# Patient Record
Sex: FEMALE | Race: OTHER | HISPANIC OR LATINO
[De-identification: names, ages, dates, MRNs, and addresses within clinical notes are randomized per-mention and may not be internally consistent; named-entity substitution may affect disease eponyms.]

---

## 2017-01-09 ENCOUNTER — RECORD ABSTRACTING (OUTPATIENT)
Age: 62
End: 2017-01-09

## 2017-01-17 ENCOUNTER — RX RENEWAL (OUTPATIENT)
Age: 62
End: 2017-01-17

## 2017-01-19 ENCOUNTER — APPOINTMENT (OUTPATIENT)
Dept: INTERNAL MEDICINE | Facility: CLINIC | Age: 62
End: 2017-01-19

## 2017-01-19 VITALS — HEIGHT: 63 IN | DIASTOLIC BLOOD PRESSURE: 74 MMHG | HEART RATE: 86 BPM | SYSTOLIC BLOOD PRESSURE: 118 MMHG

## 2017-01-19 VITALS — BODY MASS INDEX: 30.29 KG/M2 | WEIGHT: 171 LBS

## 2017-03-01 ENCOUNTER — APPOINTMENT (OUTPATIENT)
Dept: ENDOCRINOLOGY | Facility: CLINIC | Age: 62
End: 2017-03-01

## 2017-03-01 VITALS — BODY MASS INDEX: 29.41 KG/M2 | HEIGHT: 63 IN | WEIGHT: 166 LBS

## 2017-03-01 VITALS — DIASTOLIC BLOOD PRESSURE: 60 MMHG | SYSTOLIC BLOOD PRESSURE: 102 MMHG | HEART RATE: 92 BPM

## 2017-05-16 ENCOUNTER — APPOINTMENT (OUTPATIENT)
Dept: INTERNAL MEDICINE | Facility: CLINIC | Age: 62
End: 2017-05-16

## 2017-05-16 VITALS
BODY MASS INDEX: 29.41 KG/M2 | SYSTOLIC BLOOD PRESSURE: 108 MMHG | HEIGHT: 63 IN | HEART RATE: 83 BPM | WEIGHT: 166 LBS | DIASTOLIC BLOOD PRESSURE: 72 MMHG

## 2017-05-30 ENCOUNTER — OUTPATIENT (OUTPATIENT)
Dept: OUTPATIENT SERVICES | Facility: HOSPITAL | Age: 62
LOS: 1 days | Discharge: HOME | End: 2017-05-30

## 2017-06-28 DIAGNOSIS — D64.9 ANEMIA, UNSPECIFIED: ICD-10-CM

## 2017-06-28 DIAGNOSIS — E78.00 PURE HYPERCHOLESTEROLEMIA, UNSPECIFIED: ICD-10-CM

## 2017-06-28 DIAGNOSIS — R80.1 PERSISTENT PROTEINURIA, UNSPECIFIED: ICD-10-CM

## 2017-06-28 DIAGNOSIS — I10 ESSENTIAL (PRIMARY) HYPERTENSION: ICD-10-CM

## 2017-08-15 ENCOUNTER — APPOINTMENT (OUTPATIENT)
Dept: INTERNAL MEDICINE | Facility: CLINIC | Age: 62
End: 2017-08-15

## 2017-09-26 ENCOUNTER — OUTPATIENT (OUTPATIENT)
Dept: OUTPATIENT SERVICES | Facility: HOSPITAL | Age: 62
LOS: 1 days | Discharge: HOME | End: 2017-09-26

## 2017-09-26 DIAGNOSIS — E78.00 PURE HYPERCHOLESTEROLEMIA, UNSPECIFIED: ICD-10-CM

## 2017-09-26 DIAGNOSIS — D64.9 ANEMIA, UNSPECIFIED: ICD-10-CM

## 2017-09-26 DIAGNOSIS — I10 ESSENTIAL (PRIMARY) HYPERTENSION: ICD-10-CM

## 2017-09-26 DIAGNOSIS — R80.1 PERSISTENT PROTEINURIA, UNSPECIFIED: ICD-10-CM

## 2017-10-24 ENCOUNTER — OUTPATIENT (OUTPATIENT)
Dept: OUTPATIENT SERVICES | Facility: HOSPITAL | Age: 62
LOS: 1 days | Discharge: HOME | End: 2017-10-24

## 2017-10-24 ENCOUNTER — APPOINTMENT (OUTPATIENT)
Dept: INTERNAL MEDICINE | Facility: CLINIC | Age: 62
End: 2017-10-24

## 2017-10-24 VITALS
DIASTOLIC BLOOD PRESSURE: 72 MMHG | BODY MASS INDEX: 29.23 KG/M2 | HEART RATE: 90 BPM | HEIGHT: 63 IN | WEIGHT: 165 LBS | SYSTOLIC BLOOD PRESSURE: 106 MMHG

## 2017-10-25 DIAGNOSIS — E11.9 TYPE 2 DIABETES MELLITUS WITHOUT COMPLICATIONS: ICD-10-CM

## 2017-10-25 DIAGNOSIS — M32.9 SYSTEMIC LUPUS ERYTHEMATOSUS, UNSPECIFIED: ICD-10-CM

## 2017-10-25 DIAGNOSIS — K21.9 GASTRO-ESOPHAGEAL REFLUX DISEASE WITHOUT ESOPHAGITIS: ICD-10-CM

## 2017-12-08 ENCOUNTER — OUTPATIENT (OUTPATIENT)
Dept: OUTPATIENT SERVICES | Facility: HOSPITAL | Age: 62
LOS: 1 days | Discharge: HOME | End: 2017-12-08

## 2017-12-08 ENCOUNTER — APPOINTMENT (OUTPATIENT)
Dept: GASTROENTEROLOGY | Facility: CLINIC | Age: 62
End: 2017-12-08

## 2017-12-08 VITALS
DIASTOLIC BLOOD PRESSURE: 74 MMHG | BODY MASS INDEX: 29.06 KG/M2 | SYSTOLIC BLOOD PRESSURE: 112 MMHG | WEIGHT: 164 LBS | HEART RATE: 90 BPM | HEIGHT: 63 IN

## 2017-12-11 DIAGNOSIS — R74.8 ABNORMAL LEVELS OF OTHER SERUM ENZYMES: ICD-10-CM

## 2017-12-11 DIAGNOSIS — Z12.11 ENCOUNTER FOR SCREENING FOR MALIGNANT NEOPLASM OF COLON: ICD-10-CM

## 2017-12-11 DIAGNOSIS — R13.10 DYSPHAGIA, UNSPECIFIED: ICD-10-CM

## 2017-12-11 LAB
PERCENT SATURATION (NORTH): 17.4 %
TRANSFERRIN SERPL-MCNC: 266 MG/DL

## 2017-12-15 LAB
A1AT SERPL-MCNC: 158 MG/DL
CERULOPLASMIN SERPL-MCNC: 35 MG/DL
MITOCHONDRIA AB SER QL IF: NEGATIVE

## 2018-02-13 ENCOUNTER — OUTPATIENT (OUTPATIENT)
Dept: OUTPATIENT SERVICES | Facility: HOSPITAL | Age: 63
LOS: 1 days | Discharge: HOME | End: 2018-02-13

## 2018-02-13 DIAGNOSIS — I10 ESSENTIAL (PRIMARY) HYPERTENSION: ICD-10-CM

## 2018-02-13 DIAGNOSIS — E55.9 VITAMIN D DEFICIENCY, UNSPECIFIED: ICD-10-CM

## 2018-02-13 DIAGNOSIS — E78.00 PURE HYPERCHOLESTEROLEMIA, UNSPECIFIED: ICD-10-CM

## 2018-05-18 ENCOUNTER — OUTPATIENT (OUTPATIENT)
Dept: OUTPATIENT SERVICES | Facility: HOSPITAL | Age: 63
LOS: 1 days | Discharge: HOME | End: 2018-05-18

## 2018-05-18 DIAGNOSIS — Z00.00 ENCOUNTER FOR GENERAL ADULT MEDICAL EXAMINATION WITHOUT ABNORMAL FINDINGS: ICD-10-CM

## 2018-05-18 DIAGNOSIS — E11.9 TYPE 2 DIABETES MELLITUS WITHOUT COMPLICATIONS: ICD-10-CM

## 2018-05-18 LAB
ALBUMIN SERPL ELPH-MCNC: 4.2 G/DL
ALP BLD-CCNC: 79 U/L
ALT SERPL-CCNC: 53 U/L
ANION GAP SERPL CALC-SCNC: 15 MMOL/L
AST SERPL-CCNC: 29 U/L
BILIRUB SERPL-MCNC: 0.5 MG/DL
BUN SERPL-MCNC: 14 MG/DL
CALCIUM SERPL-MCNC: 10.3 MG/DL
CHLORIDE SERPL-SCNC: 96 MMOL/L
CO2 SERPL-SCNC: 23 MMOL/L
CREAT SERPL-MCNC: 0.9 MG/DL
GLUCOSE SERPL-MCNC: 262 MG/DL
POTASSIUM SERPL-SCNC: 4.7 MMOL/L
PROT SERPL-MCNC: 7.4 G/DL
SODIUM SERPL-SCNC: 134 MMOL/L

## 2018-05-21 ENCOUNTER — APPOINTMENT (OUTPATIENT)
Dept: INTERNAL MEDICINE | Facility: CLINIC | Age: 63
End: 2018-05-21

## 2018-05-21 ENCOUNTER — OUTPATIENT (OUTPATIENT)
Dept: OUTPATIENT SERVICES | Facility: HOSPITAL | Age: 63
LOS: 1 days | Discharge: HOME | End: 2018-05-21

## 2018-05-21 VITALS
BODY MASS INDEX: 28.7 KG/M2 | SYSTOLIC BLOOD PRESSURE: 119 MMHG | WEIGHT: 162 LBS | DIASTOLIC BLOOD PRESSURE: 81 MMHG | HEART RATE: 78 BPM | HEIGHT: 63 IN

## 2018-05-21 DIAGNOSIS — M32.9 SYSTEMIC LUPUS ERYTHEMATOSUS, UNSPECIFIED: ICD-10-CM

## 2018-05-21 DIAGNOSIS — I10 ESSENTIAL (PRIMARY) HYPERTENSION: ICD-10-CM

## 2018-05-21 DIAGNOSIS — E11.9 TYPE 2 DIABETES MELLITUS WITHOUT COMPLICATIONS: ICD-10-CM

## 2018-05-21 LAB
BASOPHILS # BLD AUTO: 0.04 K/UL
BASOPHILS NFR BLD AUTO: 0.7 %
EOSINOPHIL # BLD AUTO: 0.03 K/UL
EOSINOPHIL NFR BLD AUTO: 0.5 %
HBA1C MFR BLD HPLC: 11.3 %
HCT VFR BLD CALC: 41.6 %
HGB BLD-MCNC: 13.6 G/DL
IMM GRANULOCYTES NFR BLD AUTO: 0.2 %
LYMPHOCYTES # BLD AUTO: 2.76 K/UL
LYMPHOCYTES NFR BLD AUTO: 49.6 %
MAN DIFF?: NORMAL
MCHC RBC-ENTMCNC: 30.3 PG
MCHC RBC-ENTMCNC: 32.7 G/DL
MCV RBC AUTO: 92.7 FL
MONOCYTES # BLD AUTO: 0.36 K/UL
MONOCYTES NFR BLD AUTO: 6.5 %
NEUTROPHILS # BLD AUTO: 2.37 K/UL
NEUTROPHILS NFR BLD AUTO: 42.5 %
PLATELET # BLD AUTO: 325 K/UL
RBC # BLD: 4.49 M/UL
RBC # FLD: 12.7 %
WBC # FLD AUTO: 5.57 K/UL

## 2018-05-21 NOTE — HISTORY OF PRESENT ILLNESS
[FreeTextEntry1] : Follow up blood results and referral to opthalmology [de-identified] : This is 59 yo Female patient with PMH of DLD, HTN, DM II, SLE presents for reviewing her blood reports and referral to opthalmology clinic.

## 2018-05-21 NOTE — ASSESSMENT
[FreeTextEntry1] : This is 61 yo Female patient with PMH of DLD, HTN, DM II, SLE presents for reviewing her blood reports\par \par #) Diabetes Mellitus\par - uncontrolled, HbA1C 11.1\par - referral to endocrine, opthalmology and podiatry\par - repeat HbA1C in 3 months\par \par #) Dyslipidemia,oa\par - continue Lipitor 40mg\par - check lipid panel in 3 months and prn\par \par #) Hypertension \par - continue current medications \par \par #) Return to clinic in 3 months and prn.

## 2018-05-22 ENCOUNTER — APPOINTMENT (OUTPATIENT)
Dept: INTERNAL MEDICINE | Facility: CLINIC | Age: 63
End: 2018-05-22

## 2018-05-31 ENCOUNTER — OUTPATIENT (OUTPATIENT)
Dept: OUTPATIENT SERVICES | Facility: HOSPITAL | Age: 63
LOS: 1 days | Discharge: HOME | End: 2018-05-31

## 2018-05-31 ENCOUNTER — APPOINTMENT (OUTPATIENT)
Dept: ENDOCRINOLOGY | Facility: CLINIC | Age: 63
End: 2018-05-31

## 2018-05-31 VITALS
HEART RATE: 84 BPM | HEIGHT: 63 IN | WEIGHT: 162 LBS | BODY MASS INDEX: 28.7 KG/M2 | DIASTOLIC BLOOD PRESSURE: 77 MMHG | SYSTOLIC BLOOD PRESSURE: 119 MMHG

## 2018-05-31 DIAGNOSIS — Z86.39 PERSONAL HISTORY OF OTHER ENDOCRINE, NUTRITIONAL AND METABOLIC DISEASE: ICD-10-CM

## 2018-05-31 DIAGNOSIS — Z92.89 PERSONAL HISTORY OF OTHER MEDICAL TREATMENT: ICD-10-CM

## 2018-05-31 DIAGNOSIS — N18.2 CHRONIC KIDNEY DISEASE, STAGE 2 (MILD): ICD-10-CM

## 2018-05-31 DIAGNOSIS — Z12.4 ENCOUNTER FOR SCREENING FOR MALIGNANT NEOPLASM OF CERVIX: ICD-10-CM

## 2018-05-31 DIAGNOSIS — Z87.19 PERSONAL HISTORY OF OTHER DISEASES OF THE DIGESTIVE SYSTEM: ICD-10-CM

## 2018-05-31 DIAGNOSIS — Z86.018 PERSONAL HISTORY OF OTHER BENIGN NEOPLASM: ICD-10-CM

## 2018-05-31 RX ORDER — POLYETHYLENE GLYCOL 3350, SODIUM SULFATE ANHYDROUS, SODIUM BICARBONATE, SODIUM CHLORIDE, POTASSIUM CHLORIDE 227.1; 21.5; 6.36; 5.53; .754 G/L; G/L; G/L; G/L; G/L
227.1 POWDER, FOR SOLUTION ORAL
Qty: 1 | Refills: 0 | Status: COMPLETED | COMMUNITY
Start: 2017-12-08 | End: 2018-01-31

## 2018-05-31 RX ORDER — UBIDECARENONE/VIT E ACET 100MG-5
50 MCG CAPSULE ORAL
Qty: 30 | Refills: 2 | Status: ACTIVE | COMMUNITY
Start: 2018-05-31

## 2018-05-31 RX ORDER — BLOOD-GLUCOSE METER
W/DEVICE KIT MISCELLANEOUS
Qty: 1 | Refills: 0 | Status: COMPLETED | COMMUNITY
Start: 2017-03-01 | End: 2018-05-31

## 2018-05-31 RX ORDER — FAMOTIDINE 20 MG/1
20 TABLET, FILM COATED ORAL
Qty: 60 | Refills: 3 | Status: DISCONTINUED | COMMUNITY
Start: 2017-12-08 | End: 2018-05-31

## 2018-06-05 ENCOUNTER — APPOINTMENT (OUTPATIENT)
Dept: PODIATRY | Facility: CLINIC | Age: 63
End: 2018-06-05
Payer: MEDICAID

## 2018-06-05 ENCOUNTER — OUTPATIENT (OUTPATIENT)
Dept: OUTPATIENT SERVICES | Facility: HOSPITAL | Age: 63
LOS: 1 days | Discharge: HOME | End: 2018-06-05

## 2018-06-05 VITALS — SYSTOLIC BLOOD PRESSURE: 110 MMHG | DIASTOLIC BLOOD PRESSURE: 73 MMHG | HEART RATE: 95 BPM

## 2018-06-05 VITALS — WEIGHT: 162 LBS | BODY MASS INDEX: 28.7 KG/M2 | HEIGHT: 63 IN

## 2018-06-05 DIAGNOSIS — M79.674 PAIN IN RIGHT TOE(S): ICD-10-CM

## 2018-06-05 DIAGNOSIS — M79.675 PAIN IN RIGHT TOE(S): ICD-10-CM

## 2018-06-05 DIAGNOSIS — B35.1 TINEA UNGUIUM: ICD-10-CM

## 2018-06-05 PROCEDURE — 11721 DEBRIDE NAIL 6 OR MORE: CPT

## 2018-06-08 DIAGNOSIS — M79.674 PAIN IN RIGHT TOE(S): ICD-10-CM

## 2018-06-08 DIAGNOSIS — E11.9 TYPE 2 DIABETES MELLITUS WITHOUT COMPLICATIONS: ICD-10-CM

## 2018-06-08 DIAGNOSIS — B35.1 TINEA UNGUIUM: ICD-10-CM

## 2018-06-11 ENCOUNTER — OUTPATIENT (OUTPATIENT)
Dept: OUTPATIENT SERVICES | Facility: HOSPITAL | Age: 63
LOS: 1 days | Discharge: HOME | End: 2018-06-11

## 2018-06-11 DIAGNOSIS — R80.1 PERSISTENT PROTEINURIA, UNSPECIFIED: ICD-10-CM

## 2018-06-11 DIAGNOSIS — I10 ESSENTIAL (PRIMARY) HYPERTENSION: ICD-10-CM

## 2018-06-11 DIAGNOSIS — M06.4 INFLAMMATORY POLYARTHROPATHY: ICD-10-CM

## 2018-06-11 DIAGNOSIS — E78.00 PURE HYPERCHOLESTEROLEMIA, UNSPECIFIED: ICD-10-CM

## 2018-06-11 DIAGNOSIS — D50.8 OTHER IRON DEFICIENCY ANEMIAS: ICD-10-CM

## 2018-06-11 DIAGNOSIS — R79.89 OTHER SPECIFIED ABNORMAL FINDINGS OF BLOOD CHEMISTRY: ICD-10-CM

## 2018-06-20 ENCOUNTER — OTHER (OUTPATIENT)
Age: 63
End: 2018-06-20

## 2018-08-23 ENCOUNTER — APPOINTMENT (OUTPATIENT)
Dept: ENDOCRINOLOGY | Facility: CLINIC | Age: 63
End: 2018-08-23

## 2018-08-23 ENCOUNTER — APPOINTMENT (OUTPATIENT)
Dept: INTERNAL MEDICINE | Facility: CLINIC | Age: 63
End: 2018-08-23

## 2018-08-23 ENCOUNTER — OUTPATIENT (OUTPATIENT)
Dept: OUTPATIENT SERVICES | Facility: HOSPITAL | Age: 63
LOS: 1 days | Discharge: HOME | End: 2018-08-23

## 2018-08-23 VITALS
TEMPERATURE: 97.4 F | HEIGHT: 63 IN | HEART RATE: 81 BPM | BODY MASS INDEX: 29.59 KG/M2 | WEIGHT: 167 LBS | DIASTOLIC BLOOD PRESSURE: 67 MMHG | SYSTOLIC BLOOD PRESSURE: 102 MMHG
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
## 2018-08-24 DIAGNOSIS — E11.9 TYPE 2 DIABETES MELLITUS WITHOUT COMPLICATIONS: ICD-10-CM

## 2018-08-24 DIAGNOSIS — R91.1 SOLITARY PULMONARY NODULE: ICD-10-CM

## 2018-08-24 DIAGNOSIS — E78.5 HYPERLIPIDEMIA, UNSPECIFIED: ICD-10-CM

## 2018-09-18 ENCOUNTER — APPOINTMENT (OUTPATIENT)
Dept: PODIATRY | Facility: CLINIC | Age: 63
End: 2018-09-18

## 2018-09-24 ENCOUNTER — OUTPATIENT (OUTPATIENT)
Dept: OUTPATIENT SERVICES | Facility: HOSPITAL | Age: 63
LOS: 1 days | Discharge: HOME | End: 2018-09-24

## 2018-09-24 DIAGNOSIS — E78.00 PURE HYPERCHOLESTEROLEMIA, UNSPECIFIED: ICD-10-CM

## 2018-09-24 DIAGNOSIS — D64.9 ANEMIA, UNSPECIFIED: ICD-10-CM

## 2018-09-24 DIAGNOSIS — I10 ESSENTIAL (PRIMARY) HYPERTENSION: ICD-10-CM

## 2018-09-24 DIAGNOSIS — E55.9 VITAMIN D DEFICIENCY, UNSPECIFIED: ICD-10-CM

## 2018-10-05 ENCOUNTER — APPOINTMENT (OUTPATIENT)
Dept: INTERNAL MEDICINE | Facility: CLINIC | Age: 63
End: 2018-10-05

## 2018-10-05 ENCOUNTER — OUTPATIENT (OUTPATIENT)
Dept: OUTPATIENT SERVICES | Facility: HOSPITAL | Age: 63
LOS: 1 days | Discharge: HOME | End: 2018-10-05

## 2018-10-05 VITALS
HEIGHT: 63 IN | WEIGHT: 170 LBS | SYSTOLIC BLOOD PRESSURE: 121 MMHG | DIASTOLIC BLOOD PRESSURE: 75 MMHG | HEART RATE: 68 BPM | BODY MASS INDEX: 30.12 KG/M2

## 2018-10-05 DIAGNOSIS — E11.9 TYPE 2 DIABETES MELLITUS WITHOUT COMPLICATIONS: ICD-10-CM

## 2018-10-05 DIAGNOSIS — B02.9 ZOSTER WITHOUT COMPLICATIONS: ICD-10-CM

## 2018-10-05 DIAGNOSIS — B02.9 ZOSTER W/OUT COMPLICATIONS: ICD-10-CM

## 2018-10-05 RX ORDER — CANAGLIFLOZIN 300 MG/1
300 TABLET, FILM COATED ORAL
Qty: 30 | Refills: 5 | Status: DISCONTINUED | COMMUNITY
Start: 2018-05-31 | End: 2018-10-05

## 2018-10-05 NOTE — ASSESSMENT
[FreeTextEntry1] : 64 yo Female patient with PMH of DLD, HTN, DM II, SLE presents for routine visits.\par \par #Shingles rash \par - will give Valacyclovir 1 g every 8 hours for 7 days because of evidence of new lesions given by patient\par \par #) Diabetes Mellitus\par - uncontrolled, HbA1C 11.1\par - continue with Metformin, pioglitazone, victoza\par - will repeat HbA1c today\par \par #Hyperkalemia\par - Will repeat CMP, patient asked to avoid foods with high potassium\par #) Dyslipidemia\par - continue Lipitor 40mg\par - normal lipid panel \par \par #) Hypertension \par - continue benzapril\par \par #HCM: mammography and colonoscopy referral\par \par #Pulmonary Nodule: 3mm incidental pulmonary nodule\par                          \par #) Return to clinic in 2 months and prn.

## 2018-10-05 NOTE — PHYSICAL EXAM

## 2018-10-05 NOTE — HISTORY OF PRESENT ILLNESS
[de-identified] : 64 yo Female patient with PMH of DLD, HTN, DM II, SLE presents for routine visits.\par She currently complaints of rash around her back and flank for the last 5 days. The rash is erythematous and painful with sharp radiating pain to the waist. She has a history of Chicken Pox when she was 13 years old\par Her last HbA1c was 11.6, endocrine saw her and adjusted her medicines\par

## 2018-10-18 ENCOUNTER — OUTPATIENT (OUTPATIENT)
Dept: OUTPATIENT SERVICES | Facility: HOSPITAL | Age: 63
LOS: 1 days | Discharge: HOME | End: 2018-10-18

## 2018-10-18 DIAGNOSIS — Z12.31 ENCOUNTER FOR SCREENING MAMMOGRAM FOR MALIGNANT NEOPLASM OF BREAST: ICD-10-CM

## 2018-11-02 ENCOUNTER — OUTPATIENT (OUTPATIENT)
Dept: OUTPATIENT SERVICES | Facility: HOSPITAL | Age: 63
LOS: 1 days | Discharge: HOME | End: 2018-11-02

## 2018-11-02 ENCOUNTER — APPOINTMENT (OUTPATIENT)
Dept: PULMONOLOGY | Facility: CLINIC | Age: 63
End: 2018-11-02

## 2018-11-02 VITALS
WEIGHT: 172 LBS | HEIGHT: 63 IN | SYSTOLIC BLOOD PRESSURE: 116 MMHG | BODY MASS INDEX: 30.48 KG/M2 | DIASTOLIC BLOOD PRESSURE: 75 MMHG | HEART RATE: 77 BPM

## 2018-11-19 ENCOUNTER — OUTPATIENT (OUTPATIENT)
Dept: OUTPATIENT SERVICES | Facility: HOSPITAL | Age: 63
LOS: 1 days | Discharge: HOME | End: 2018-11-19

## 2018-11-19 DIAGNOSIS — R06.02 SHORTNESS OF BREATH: ICD-10-CM

## 2018-12-04 ENCOUNTER — APPOINTMENT (OUTPATIENT)
Dept: INTERNAL MEDICINE | Facility: CLINIC | Age: 63
End: 2018-12-04

## 2018-12-06 ENCOUNTER — OUTPATIENT (OUTPATIENT)
Dept: OUTPATIENT SERVICES | Facility: HOSPITAL | Age: 63
LOS: 1 days | Discharge: HOME | End: 2018-12-06

## 2018-12-06 DIAGNOSIS — R91.1 SOLITARY PULMONARY NODULE: ICD-10-CM

## 2018-12-06 LAB
ALBUMIN SERPL ELPH-MCNC: 4.6 G/DL
ALP BLD-CCNC: 85 U/L
ALT SERPL-CCNC: 18 U/L
ANION GAP SERPL CALC-SCNC: 11 MMOL/L
AST SERPL-CCNC: 20 U/L
BASOPHILS # BLD AUTO: 0.03 K/UL
BASOPHILS NFR BLD AUTO: 0.7 %
BILIRUB SERPL-MCNC: 0.3 MG/DL
BUN SERPL-MCNC: 18 MG/DL
CALCIUM SERPL-MCNC: 10 MG/DL
CHLORIDE SERPL-SCNC: 100 MMOL/L
CHOLEST SERPL-MCNC: 173 MG/DL
CHOLEST/HDLC SERPL: 3.1 RATIO
CO2 SERPL-SCNC: 28 MMOL/L
CREAT SERPL-MCNC: 0.9 MG/DL
EOSINOPHIL # BLD AUTO: 0.03 K/UL
EOSINOPHIL NFR BLD AUTO: 0.7 %
ESTIMATED AVERAGE GLUCOSE: 134 MG/DL
GLUCOSE SERPL-MCNC: 102 MG/DL
HBA1C MFR BLD HPLC: 6.3 %
HCT VFR BLD CALC: 42.1 %
HDLC SERPL-MCNC: 56 MG/DL
HGB BLD-MCNC: 13.4 G/DL
IMM GRANULOCYTES NFR BLD AUTO: 0.2 %
LDLC SERPL CALC-MCNC: 104 MG/DL
LYMPHOCYTES # BLD AUTO: 1.98 K/UL
LYMPHOCYTES NFR BLD AUTO: 48.4 %
MAN DIFF?: NORMAL
MCHC RBC-ENTMCNC: 30.7 PG
MCHC RBC-ENTMCNC: 31.8 G/DL
MCV RBC AUTO: 96.3 FL
MONOCYTES # BLD AUTO: 0.32 K/UL
MONOCYTES NFR BLD AUTO: 7.8 %
NEUTROPHILS # BLD AUTO: 1.72 K/UL
NEUTROPHILS NFR BLD AUTO: 42.2 %
PLATELET # BLD AUTO: 306 K/UL
POTASSIUM SERPL-SCNC: 5.3 MMOL/L
PROT SERPL-MCNC: 7.7 G/DL
RBC # BLD: 4.37 M/UL
RBC # FLD: 13.1 %
SODIUM SERPL-SCNC: 139 MMOL/L
TRIGL SERPL-MCNC: 83 MG/DL
WBC # FLD AUTO: 4.09 K/UL

## 2018-12-12 ENCOUNTER — OUTPATIENT (OUTPATIENT)
Dept: OUTPATIENT SERVICES | Facility: HOSPITAL | Age: 63
LOS: 1 days | Discharge: HOME | End: 2018-12-12

## 2018-12-12 ENCOUNTER — APPOINTMENT (OUTPATIENT)
Dept: INTERNAL MEDICINE | Facility: CLINIC | Age: 63
End: 2018-12-12

## 2018-12-12 VITALS
DIASTOLIC BLOOD PRESSURE: 69 MMHG | TEMPERATURE: 97.6 F | WEIGHT: 176 LBS | BODY MASS INDEX: 31.18 KG/M2 | SYSTOLIC BLOOD PRESSURE: 106 MMHG | HEIGHT: 63 IN | HEART RATE: 97 BPM | RESPIRATION RATE: 18 BRPM

## 2018-12-12 NOTE — PHYSICAL EXAM
[No Acute Distress] : no acute distress [Well Nourished] : well nourished [No Respiratory Distress] : no respiratory distress  [Clear to Auscultation] : lungs were clear to auscultation bilaterally [No Accessory Muscle Use] : no accessory muscle use [Normal Rate] : normal rate  [Regular Rhythm] : with a regular rhythm [Normal S1, S2] : normal S1 and S2 [No Carotid Bruits] : no carotid bruits [No Edema] : there was no peripheral edema [Soft] : abdomen soft [Non Tender] : non-tender [No HSM] : no HSM [Normal Supraclavicular Nodes] : no supraclavicular lymphadenopathy [No CVA Tenderness] : no CVA  tenderness [No Joint Swelling] : no joint swelling [No Rash] : no rash [Normal Gait] : normal gait

## 2018-12-12 NOTE — REVIEW OF SYSTEMS
[Negative] : Neurological [de-identified] : somnolent patient, concerned about her sons incarceration

## 2018-12-12 NOTE — HISTORY OF PRESENT ILLNESS
[de-identified] : 62 yo Female patient with PMH of DLD, HTN, DM II, SLE presents for routine visits.\par \par

## 2018-12-12 NOTE — ASSESSMENT
[FreeTextEntry1] : 64 yo Female patient with PMH of DLD, HTN, DM II, SLE presents for routine visits.\par \par # Shingles rash \par - resolved\par \par # Diabetes Mellitus\par - hba1c now 6.2\par - continue with Metformin, pioglitazone, victoza\par \par # Chronic Hyperkalemia\par - will monitor on subsequent visit\par \par # Dyslipidemia\par - continue Lipitor 40mg daily\par - normal lipid panel \par \par # Hypertension, controlled\par - continue benzapril\par \par HCM: \par mammography done no evidence of malignancy\par Pulmonary Nodule: 3mm incidental pulmonary nodule, will continue to monitor as per guidelines\par flu shot given\par - rto in 6 months and prn\par

## 2018-12-13 DIAGNOSIS — I10 ESSENTIAL (PRIMARY) HYPERTENSION: ICD-10-CM

## 2018-12-13 DIAGNOSIS — E11.9 TYPE 2 DIABETES MELLITUS WITHOUT COMPLICATIONS: ICD-10-CM

## 2018-12-13 DIAGNOSIS — M32.9 SYSTEMIC LUPUS ERYTHEMATOSUS, UNSPECIFIED: ICD-10-CM

## 2019-02-04 ENCOUNTER — OUTPATIENT (OUTPATIENT)
Dept: OUTPATIENT SERVICES | Facility: HOSPITAL | Age: 64
LOS: 1 days | Discharge: HOME | End: 2019-02-04

## 2019-02-04 DIAGNOSIS — R80.1 PERSISTENT PROTEINURIA, UNSPECIFIED: ICD-10-CM

## 2019-02-04 DIAGNOSIS — E78.00 PURE HYPERCHOLESTEROLEMIA, UNSPECIFIED: ICD-10-CM

## 2019-02-04 DIAGNOSIS — M06.4 INFLAMMATORY POLYARTHROPATHY: ICD-10-CM

## 2019-02-04 DIAGNOSIS — I10 ESSENTIAL (PRIMARY) HYPERTENSION: ICD-10-CM

## 2019-02-04 DIAGNOSIS — R80.9 PROTEINURIA, UNSPECIFIED: ICD-10-CM

## 2019-02-04 DIAGNOSIS — E11.9 TYPE 2 DIABETES MELLITUS WITHOUT COMPLICATIONS: ICD-10-CM

## 2019-02-04 DIAGNOSIS — E55.9 VITAMIN D DEFICIENCY, UNSPECIFIED: ICD-10-CM

## 2019-02-04 DIAGNOSIS — E21.3 HYPERPARATHYROIDISM, UNSPECIFIED: ICD-10-CM

## 2019-02-04 DIAGNOSIS — D50.8 OTHER IRON DEFICIENCY ANEMIAS: ICD-10-CM

## 2019-02-04 DIAGNOSIS — R79.89 OTHER SPECIFIED ABNORMAL FINDINGS OF BLOOD CHEMISTRY: ICD-10-CM

## 2019-03-08 ENCOUNTER — OUTPATIENT (OUTPATIENT)
Dept: OUTPATIENT SERVICES | Facility: HOSPITAL | Age: 64
LOS: 1 days | Discharge: HOME | End: 2019-03-08

## 2019-03-08 ENCOUNTER — APPOINTMENT (OUTPATIENT)
Dept: PULMONOLOGY | Facility: CLINIC | Age: 64
End: 2019-03-08

## 2019-03-08 VITALS
HEIGHT: 63 IN | DIASTOLIC BLOOD PRESSURE: 66 MMHG | WEIGHT: 176 LBS | OXYGEN SATURATION: 98 % | SYSTOLIC BLOOD PRESSURE: 108 MMHG | BODY MASS INDEX: 31.18 KG/M2 | HEART RATE: 79 BPM

## 2019-03-08 NOTE — HISTORY OF PRESENT ILLNESS
[FreeTextEntry1] : 62 yo Female patient with PMH of incidental finding 3mm right lung nodule, DLD, HTN, DM II, SLE, former tobacco user ( 1/2 pack daily for over 30 years. Quit 5 years ago ) presents for a f/u. \par  \par CT chest (12/18)\par IMPRESSION:  Right upper 2 mm calcified nodule, likely represents a granuloma.  No adenopathy mass or effusions.  Right thyroid nodule measuring up to 1.4 cm (series 4.6 cm).

## 2019-03-08 NOTE — END OF VISIT
[] : Resident [FreeTextEntry3] : concur with the above assessment and plan [Time Spent: ___ minutes] : I have spent [unfilled] minutes of face to face time with the patient

## 2019-03-08 NOTE — ASSESSMENT
[FreeTextEntry1] : 62 yo Female patient with PMH of DLD, HTN, DM II, SLE presents for evaluation of lung nodule on xray.\par \par #) Pulmonary nodule\par - Xray reports shows 3mm peripheral right midlung nodule\par -  CT chest shows 2mm granuloma Rt upper lung

## 2019-03-08 NOTE — PHYSICAL EXAM
[General Appearance - Well Developed] : well developed [Normal Appearance] : normal appearance [General Appearance - Well Nourished] : well nourished [Neck Appearance] : the appearance of the neck was normal [Heart Rate And Rhythm] : heart rate and rhythm were normal [Heart Sounds] : normal S1 and S2 [Murmurs] : no murmurs present [] : no respiratory distress [Respiration, Rhythm And Depth] : normal respiratory rhythm and effort [Exaggerated Use Of Accessory Muscles For Inspiration] : no accessory muscle use [Nail Clubbing] : no clubbing of the fingernails [Cyanosis, Localized] : no localized cyanosis

## 2019-03-15 DIAGNOSIS — R91.1 SOLITARY PULMONARY NODULE: ICD-10-CM

## 2019-06-03 ENCOUNTER — OUTPATIENT (OUTPATIENT)
Dept: OUTPATIENT SERVICES | Facility: HOSPITAL | Age: 64
LOS: 1 days | Discharge: HOME | End: 2019-06-03

## 2019-06-03 DIAGNOSIS — I10 ESSENTIAL (PRIMARY) HYPERTENSION: ICD-10-CM

## 2019-06-03 DIAGNOSIS — R80.1 PERSISTENT PROTEINURIA, UNSPECIFIED: ICD-10-CM

## 2019-06-03 DIAGNOSIS — E78.00 PURE HYPERCHOLESTEROLEMIA, UNSPECIFIED: ICD-10-CM

## 2019-07-02 ENCOUNTER — APPOINTMENT (OUTPATIENT)
Dept: INTERNAL MEDICINE | Facility: CLINIC | Age: 64
End: 2019-07-02

## 2019-07-02 ENCOUNTER — OUTPATIENT (OUTPATIENT)
Dept: OUTPATIENT SERVICES | Facility: HOSPITAL | Age: 64
LOS: 1 days | Discharge: HOME | End: 2019-07-02

## 2019-07-02 VITALS
SYSTOLIC BLOOD PRESSURE: 122 MMHG | TEMPERATURE: 98.7 F | BODY MASS INDEX: 31.54 KG/M2 | HEART RATE: 73 BPM | DIASTOLIC BLOOD PRESSURE: 82 MMHG | HEIGHT: 63 IN | WEIGHT: 178 LBS

## 2019-07-02 RX ORDER — VALACYCLOVIR 1 G/1
1 TABLET, FILM COATED ORAL 3 TIMES DAILY
Qty: 21 | Refills: 0 | Status: DISCONTINUED | COMMUNITY
Start: 2018-10-05 | End: 2019-07-02

## 2019-07-02 NOTE — ASSESSMENT
[FreeTextEntry1] : 62 yo Female patient with PMH of DLD, HTN, DM II, SLE presents for routine visits.\par \par Diabetes Mellitus\par - hba1c 6.2 \par - repeat routine HbA1C\par - continue with Metformin, pioglitazone, victoza\par \par Chronic Hyperkalemia\par - BMP today\par \par Dyslipidemia\par - continue Lipitor 40mg daily\par - normal lipid panel \par - repeat \par \par Hypertension, controlled\par - continue benzapril\par \par HCM: \par mammography done no evidence of malignancy\par Pulmonary Nodule: 3mm incidental pulmonary nodule, will continue to monitor as per guidelines\par flu shot given 2018\par last PAP 10/19/18\par due for colonoscopy 9/2019. Last colo 1/2010\par refer for eye exam\par \par RTC 6 months and prn,

## 2019-07-02 NOTE — PHYSICAL EXAM
[No Acute Distress] : no acute distress [Well Developed] : well developed [Well-Appearing] : well-appearing [Well Nourished] : well nourished [Normal Sclera/Conjunctiva] : normal sclera/conjunctiva [PERRL] : pupils equal round and reactive to light [EOMI] : extraocular movements intact [Normal Outer Ear/Nose] : the outer ears and nose were normal in appearance [Normal Oropharynx] : the oropharynx was normal [No JVD] : no jugular venous distention [No Lymphadenopathy] : no lymphadenopathy [Supple] : supple [Thyroid Normal, No Nodules] : the thyroid was normal and there were no nodules present [No Respiratory Distress] : no respiratory distress  [No Accessory Muscle Use] : no accessory muscle use [Clear to Auscultation] : lungs were clear to auscultation bilaterally [Normal Rate] : normal rate  [Regular Rhythm] : with a regular rhythm [Normal S1, S2] : normal S1 and S2 [No Murmur] : no murmur heard [Pedal Pulses Present] : the pedal pulses are present [No Edema] : there was no peripheral edema [Soft] : abdomen soft [No Extremity Clubbing/Cyanosis] : no extremity clubbing/cyanosis [Non Tender] : non-tender [Non-distended] : non-distended [No Masses] : no abdominal mass palpated [No HSM] : no HSM [Normal Bowel Sounds] : normal bowel sounds [No CVA Tenderness] : no CVA  tenderness [No Spinal Tenderness] : no spinal tenderness [No Joint Swelling] : no joint swelling [No Rash] : no rash [Grossly Normal Strength/Tone] : grossly normal strength/tone [Coordination Grossly Intact] : coordination grossly intact [Normal Gait] : normal gait [No Focal Deficits] : no focal deficits [Normal Affect] : the affect was normal [Deep Tendon Reflexes (DTR)] : deep tendon reflexes were 2+ and symmetric [Normal Insight/Judgement] : insight and judgment were intact

## 2019-07-02 NOTE — HISTORY OF PRESENT ILLNESS
[FreeTextEntry1] : routine follow up examination [de-identified] : 62 yo Female patient with PMH of DLD, HTN, DM II, SLE presents for routine follow up examination. She states that she feels well overall and has no current complaints.\par \par

## 2019-07-03 DIAGNOSIS — E78.5 HYPERLIPIDEMIA, UNSPECIFIED: ICD-10-CM

## 2019-07-03 DIAGNOSIS — M32.9 SYSTEMIC LUPUS ERYTHEMATOSUS, UNSPECIFIED: ICD-10-CM

## 2019-07-03 DIAGNOSIS — E11.9 TYPE 2 DIABETES MELLITUS WITHOUT COMPLICATIONS: ICD-10-CM

## 2019-07-03 DIAGNOSIS — R91.1 SOLITARY PULMONARY NODULE: ICD-10-CM

## 2019-07-03 DIAGNOSIS — I10 ESSENTIAL (PRIMARY) HYPERTENSION: ICD-10-CM

## 2019-08-06 ENCOUNTER — OUTPATIENT (OUTPATIENT)
Dept: OUTPATIENT SERVICES | Facility: HOSPITAL | Age: 64
LOS: 1 days | Discharge: HOME | End: 2019-08-06

## 2019-08-06 DIAGNOSIS — R80.9 PROTEINURIA, UNSPECIFIED: ICD-10-CM

## 2019-08-06 DIAGNOSIS — M06.4 INFLAMMATORY POLYARTHROPATHY: ICD-10-CM

## 2019-08-06 DIAGNOSIS — M32.10 SYSTEMIC LUPUS ERYTHEMATOSUS, ORGAN OR SYSTEM INVOLVEMENT UNSPECIFIED: ICD-10-CM

## 2019-08-06 DIAGNOSIS — D50.8 OTHER IRON DEFICIENCY ANEMIAS: ICD-10-CM

## 2019-08-06 DIAGNOSIS — D84.1 DEFECTS IN THE COMPLEMENT SYSTEM: ICD-10-CM

## 2019-08-15 ENCOUNTER — RX RENEWAL (OUTPATIENT)
Age: 64
End: 2019-08-15

## 2019-09-03 ENCOUNTER — OUTPATIENT (OUTPATIENT)
Dept: OUTPATIENT SERVICES | Facility: HOSPITAL | Age: 64
LOS: 1 days | Discharge: HOME | End: 2019-09-03

## 2019-09-03 DIAGNOSIS — E78.00 PURE HYPERCHOLESTEROLEMIA, UNSPECIFIED: ICD-10-CM

## 2019-09-03 DIAGNOSIS — M32.14 GLOMERULAR DISEASE IN SYSTEMIC LUPUS ERYTHEMATOSUS: ICD-10-CM

## 2019-09-03 DIAGNOSIS — R80.1 PERSISTENT PROTEINURIA, UNSPECIFIED: ICD-10-CM

## 2019-11-19 ENCOUNTER — OUTPATIENT (OUTPATIENT)
Dept: OUTPATIENT SERVICES | Facility: HOSPITAL | Age: 64
LOS: 1 days | Discharge: HOME | End: 2019-11-19

## 2019-11-19 DIAGNOSIS — E55.9 VITAMIN D DEFICIENCY, UNSPECIFIED: ICD-10-CM

## 2019-11-19 DIAGNOSIS — M06.4 INFLAMMATORY POLYARTHROPATHY: ICD-10-CM

## 2019-11-19 DIAGNOSIS — R70.1 ABNORMAL PLASMA VISCOSITY: ICD-10-CM

## 2019-11-19 DIAGNOSIS — M32.14 GLOMERULAR DISEASE IN SYSTEMIC LUPUS ERYTHEMATOSUS: ICD-10-CM

## 2019-11-19 DIAGNOSIS — M79.10 MYALGIA, UNSPECIFIED SITE: ICD-10-CM

## 2019-11-19 DIAGNOSIS — E11.9 TYPE 2 DIABETES MELLITUS WITHOUT COMPLICATIONS: ICD-10-CM

## 2019-11-19 DIAGNOSIS — D50.8 OTHER IRON DEFICIENCY ANEMIAS: ICD-10-CM

## 2019-11-19 DIAGNOSIS — R79.89 OTHER SPECIFIED ABNORMAL FINDINGS OF BLOOD CHEMISTRY: ICD-10-CM

## 2019-11-25 ENCOUNTER — APPOINTMENT (OUTPATIENT)
Dept: INTERNAL MEDICINE | Facility: CLINIC | Age: 64
End: 2019-11-25

## 2019-12-16 ENCOUNTER — APPOINTMENT (OUTPATIENT)
Dept: INTERNAL MEDICINE | Facility: CLINIC | Age: 64
End: 2019-12-16
Payer: COMMERCIAL

## 2019-12-16 ENCOUNTER — OUTPATIENT (OUTPATIENT)
Dept: OUTPATIENT SERVICES | Facility: HOSPITAL | Age: 64
LOS: 1 days | Discharge: HOME | End: 2019-12-16

## 2019-12-16 VITALS
DIASTOLIC BLOOD PRESSURE: 67 MMHG | SYSTOLIC BLOOD PRESSURE: 110 MMHG | HEART RATE: 105 BPM | HEIGHT: 63 IN | BODY MASS INDEX: 30.3 KG/M2 | WEIGHT: 171 LBS

## 2019-12-16 PROCEDURE — 99213 OFFICE O/P EST LOW 20 MIN: CPT | Mod: GC

## 2019-12-16 NOTE — PHYSICAL EXAM
[No Acute Distress] : no acute distress [Well Nourished] : well nourished [Well Developed] : well developed [Normal Sclera/Conjunctiva] : normal sclera/conjunctiva [Well-Appearing] : well-appearing [Supple] : supple [EOMI] : extraocular movements intact [No Accessory Muscle Use] : no accessory muscle use [No Respiratory Distress] : no respiratory distress  [Normal Rate] : normal rate  [Clear to Auscultation] : lungs were clear to auscultation bilaterally [Soft] : abdomen soft [No Edema] : there was no peripheral edema [Non Tender] : non-tender [Non-distended] : non-distended [No Rash] : no rash [No HSM] : no HSM [Normal Bowel Sounds] : normal bowel sounds [No Focal Deficits] : no focal deficits [Normal Gait] : normal gait

## 2019-12-16 NOTE — PHYSICAL EXAM
[No Acute Distress] : no acute distress [Well Nourished] : well nourished [Normal Sclera/Conjunctiva] : normal sclera/conjunctiva [Well Developed] : well developed [Well-Appearing] : well-appearing [EOMI] : extraocular movements intact [Supple] : supple [No Accessory Muscle Use] : no accessory muscle use [No Respiratory Distress] : no respiratory distress  [Clear to Auscultation] : lungs were clear to auscultation bilaterally [Normal Rate] : normal rate  [No Edema] : there was no peripheral edema [Soft] : abdomen soft [Non-distended] : non-distended [Non Tender] : non-tender [No Rash] : no rash [No HSM] : no HSM [Normal Bowel Sounds] : normal bowel sounds [Normal Gait] : normal gait [No Focal Deficits] : no focal deficits

## 2019-12-16 NOTE — ASSESSMENT
[FreeTextEntry1] : 65 yo F with hx HTN, HLD, DM II, and SLE with proteinuria presents to clinic for follow up. Patient reports that she has some pain in her right upper back (feels like coming from inside of lung) and it happens when swallow water. \par \par \par Diabetes Mellitus\par - hba1c 8.2 % Nov 2019.\par - continue with Metformin, pioglitazone, victoza\par - repeat HbA1c in 3 months. \par - f/u Endocrine \par - will give referral for ophthalmology and Podiatry. \par \par Dyslipidemia\par - continue Lipitor 40mg daily\par - normal lipid panel \par \par Hypertension\par - continue benazepril, Spironolactone \par \par Pulmonary nodule \par - CT chest Dec 2018: Right upper 2 mm calcified nodule, likely represents a granuloma.\par - former smoker (0.5 pack per day), smoked for 35 years. Quitted in 2013. \par \par Thyroid nodule\par - CT chest Dec 2018: Right thyroid nodule measuring up to 1.4 cm. \par - TSH 2.25 (Nov 2019)\par - US thyroid \par - f/u Endocrine \par \par SLE with proteinuria \par - on Hydroxychloroquine \par - pt is on benazepril and spironolactone (prescribed by Nephrologist)\par - pt is following Rheumatology. \par \par Heart burn\par - will increase omeprazole from 20mg to 40mg \par - pt reports intermittent dysphagia. \par - GI referral for possible EGD\par \par \par HCM: \par - Mammogram Oct 2018: BiRADs 2 (benign). Will refer to mammogram. \par - Flu shot and PPSV 23 given today. \par - Pap smear: patient reports that she had total hysterectomy including cervix in 2006. \par - Last colonoscopy 1/2010 which was unremarkable as per patient. Will refer to GI for colonoscopy and EGD. \par \par

## 2019-12-16 NOTE — HISTORY OF PRESENT ILLNESS
[de-identified] : 65 yo F with hx HTN, HLD, DM II, Heartburn and SLE with proteinuria presents to clinic for follow up. Patient reports that she has some pain in her right upper back (feels like coming from inside of lung) and it happens when swallow water. \par \par \par

## 2019-12-16 NOTE — HISTORY OF PRESENT ILLNESS
[de-identified] : 65 yo F with hx HTN, HLD, DM II, Heartburn and SLE with proteinuria presents to clinic for follow up. Patient reports that she has some pain in her right upper back (feels like coming from inside of lung) and it happens when swallow water. \par \par \par

## 2019-12-16 NOTE — ASSESSMENT
[FreeTextEntry1] : 63 yo F with hx HTN, HLD, DM II, and SLE with proteinuria presents to clinic for follow up. Patient reports that she has some pain in her right upper back (feels like coming from inside of lung) and it happens when swallow water. \par \par \par Diabetes Mellitus\par - hba1c 8.2 % Nov 2019.\par - continue with Metformin, pioglitazone, victoza\par - repeat HbA1c in 3 months. \par - f/u Endocrine \par - will give referral for ophthalmology and Podiatry. \par \par Dyslipidemia\par - continue Lipitor 40mg daily\par - normal lipid panel \par \par Hypertension\par - continue benazepril, Spironolactone \par \par Pulmonary nodule \par - CT chest Dec 2018: Right upper 2 mm calcified nodule, likely represents a granuloma.\par - former smoker (0.5 pack per day), smoked for 35 years. Quitted in 2013. \par \par Thyroid nodule\par - CT chest Dec 2018: Right thyroid nodule measuring up to 1.4 cm. \par - TSH 2.25 (Nov 2019)\par - US thyroid \par - f/u Endocrine \par \par SLE with proteinuria \par - on Hydroxychloroquine \par - pt is on benazepril and spironolactone (prescribed by Nephrologist)\par - pt is following Rheumatology. \par \par Heart burn\par - will increase omeprazole from 20mg to 40mg \par - pt reports intermittent dysphagia. \par - GI referral for possible EGD\par \par \par HCM: \par - Mammogram Oct 2018: BiRADs 2 (benign). Will refer to mammogram. \par - Flu shot and PPSV 23 given today. \par - Pap smear: patient reports that she had total hysterectomy including cervix in 2006. \par - Last colonoscopy 1/2010 which was unremarkable as per patient. Will refer to GI for colonoscopy and EGD. \par \par

## 2019-12-17 DIAGNOSIS — M32.9 SYSTEMIC LUPUS ERYTHEMATOSUS, UNSPECIFIED: ICD-10-CM

## 2019-12-17 DIAGNOSIS — I10 ESSENTIAL (PRIMARY) HYPERTENSION: ICD-10-CM

## 2019-12-17 DIAGNOSIS — E04.1 NONTOXIC SINGLE THYROID NODULE: ICD-10-CM

## 2019-12-17 DIAGNOSIS — E78.5 HYPERLIPIDEMIA, UNSPECIFIED: ICD-10-CM

## 2020-01-02 ENCOUNTER — OUTPATIENT (OUTPATIENT)
Dept: OUTPATIENT SERVICES | Facility: HOSPITAL | Age: 65
LOS: 1 days | Discharge: HOME | End: 2020-01-02

## 2020-01-02 ENCOUNTER — APPOINTMENT (OUTPATIENT)
Dept: ENDOCRINOLOGY | Facility: CLINIC | Age: 65
End: 2020-01-02

## 2020-01-02 VITALS
BODY MASS INDEX: 30.3 KG/M2 | HEIGHT: 63 IN | SYSTOLIC BLOOD PRESSURE: 119 MMHG | WEIGHT: 171 LBS | HEART RATE: 86 BPM | DIASTOLIC BLOOD PRESSURE: 79 MMHG

## 2020-01-02 NOTE — ASSESSMENT
[FreeTextEntry1] : \par 59 yo Female patient with PMH of DLD, HTN, DM II, SLE presents for follow up visit\par \par # Diabetes type II\par - was taking, Metformin 500 BID, Pioglitazone QD, Victoza QD\par - started on Steglartro \par - HbA1c 6.9 12/2018, on 11/19 8.2\par - Currently overweight, encouraged stricter diet control and exercise\par - f/u opthalmology and podiatry. \par \par Return in 3 months\par \par

## 2020-01-02 NOTE — PHYSICAL EXAM
[Well Nourished] : well nourished [Well Developed] : well developed [No JVD] : no jugular venous distention [No Carotid Bruits] : no carotid bruits [No Abdominal Bruit] : a ~M bruit was not heard ~T in the abdomen [No Joint Swelling] : no joint swelling [No Rash] : no rash [Normal] : affect was normal and insight and judgment were intact

## 2020-01-02 NOTE — HISTORY OF PRESENT ILLNESS
[FreeTextEntry1] : This is 61 yo Female patient with PMH of DLD, HTN, DM II, SLE presents for follow up. Recent HbA1c 8.2, was 6.9 Dec 2018,  in 2018.Currently on Metformin, pioglitazone and Victoza.  She denies any polyuria but admits to having polydipsia. Per the patient, metformin at 1000 mg gives her diarrhea, currently tolerating 500 mg. Does not follow up with podiatry, opthomologist up to date. She monitors her blood sugars once every 2 days in the morning before a meal and they usually range in the 200s. \par

## 2020-01-15 ENCOUNTER — FORM ENCOUNTER (OUTPATIENT)
Age: 65
End: 2020-01-15

## 2020-01-16 ENCOUNTER — OUTPATIENT (OUTPATIENT)
Dept: OUTPATIENT SERVICES | Facility: HOSPITAL | Age: 65
LOS: 1 days | Discharge: HOME | End: 2020-01-16
Payer: MEDICAID

## 2020-01-16 PROCEDURE — 77067 SCR MAMMO BI INCL CAD: CPT | Mod: 26

## 2020-01-16 PROCEDURE — 77063 BREAST TOMOSYNTHESIS BI: CPT | Mod: 26

## 2020-01-27 ENCOUNTER — FORM ENCOUNTER (OUTPATIENT)
Age: 65
End: 2020-01-27

## 2020-01-28 ENCOUNTER — OUTPATIENT (OUTPATIENT)
Dept: OUTPATIENT SERVICES | Facility: HOSPITAL | Age: 65
LOS: 1 days | Discharge: HOME | End: 2020-01-28
Payer: MEDICAID

## 2020-01-28 DIAGNOSIS — R92.8 OTHER ABNORMAL AND INCONCLUSIVE FINDINGS ON DIAGNOSTIC IMAGING OF BREAST: ICD-10-CM

## 2020-01-28 DIAGNOSIS — E04.1 NONTOXIC SINGLE THYROID NODULE: ICD-10-CM

## 2020-01-28 PROCEDURE — 76536 US EXAM OF HEAD AND NECK: CPT | Mod: 26

## 2020-01-28 PROCEDURE — 77061 BREAST TOMOSYNTHESIS UNI: CPT | Mod: 26

## 2020-01-28 PROCEDURE — 77065 DX MAMMO INCL CAD UNI: CPT | Mod: 26,RT

## 2020-01-31 ENCOUNTER — APPOINTMENT (OUTPATIENT)
Dept: GASTROENTEROLOGY | Facility: CLINIC | Age: 65
End: 2020-01-31
Payer: COMMERCIAL

## 2020-01-31 ENCOUNTER — OUTPATIENT (OUTPATIENT)
Dept: OUTPATIENT SERVICES | Facility: HOSPITAL | Age: 65
LOS: 1 days | Discharge: HOME | End: 2020-01-31

## 2020-01-31 VITALS
HEIGHT: 63 IN | BODY MASS INDEX: 29.77 KG/M2 | DIASTOLIC BLOOD PRESSURE: 78 MMHG | HEART RATE: 98 BPM | SYSTOLIC BLOOD PRESSURE: 116 MMHG | WEIGHT: 168 LBS

## 2020-01-31 DIAGNOSIS — Z80.0 FAMILY HISTORY OF MALIGNANT NEOPLASM OF DIGESTIVE ORGANS: ICD-10-CM

## 2020-01-31 DIAGNOSIS — Z87.891 PERSONAL HISTORY OF NICOTINE DEPENDENCE: ICD-10-CM

## 2020-01-31 PROCEDURE — 99214 OFFICE O/P EST MOD 30 MIN: CPT

## 2020-01-31 NOTE — PHYSICAL EXAM
[General Appearance - Alert] : alert [Sclera] : the sclera and conjunctiva were normal [General Appearance - In No Acute Distress] : in no acute distress [PERRL With Normal Accommodation] : pupils were equal in size, round, and reactive to light [Outer Ear] : the ears and nose were normal in appearance [Hearing Threshold Finger Rub Not [place]] : hearing was normal [Neck Appearance] : the appearance of the neck was normal [Exaggerated Use Of Accessory Muscles For Inspiration] : no accessory muscle use [Heart Sounds] : normal S1 and S2 [Heart Sounds Gallop] : no gallops [Bowel Sounds] : normal bowel sounds [Abdomen Soft] : soft [Abdomen Tenderness] : non-tender [] : no hepato-splenomegaly [Abdomen Mass (___ Cm)] : no abdominal mass palpated

## 2020-01-31 NOTE — HISTORY OF PRESENT ILLNESS
[Fair Compliance] : fair compliance with treatment [Heartburn] : heartburn worsened [Nausea] : denies nausea [Yellow Skin Or Eyes (Jaundice)] : denies jaundice [Vomiting] : denies vomiting [Constipation] : denies constipation [Diarrhea] : denies diarrhea [Abdominal Pain] : denies abdominal pain [Abdominal Swelling] : denies abdominal swelling [de-identified] : This is 65 yo Female patient with PMH of DLD, HTN, DM II, SLE presents for evaluation of screening CRC and dysphagia . Patient states she has heartburn for the last 5 yrs, controlled on omeprazole 40mg daily but if she does not take then she has symptoms.\par She also c/o pain in back when she swallows liquids which is been going on for the last 3 months. Solid food gets stuck in the upper chest sometimes.Weight loss 4 pounds in last 3 weeks. No other alarming signs.\par She was seen in 2017 where she was planned for EGD and colonoscopy but she missed because of snowfall. \par \par Colonoscopy in 2005 by Dr Rios : inadequate prep, diverticulosis, internal hemorrhoids

## 2020-01-31 NOTE — REVIEW OF SYSTEMS
[Heartburn] : heartburn [Fever] : no fever [Chills] : no chills [Eye Pain] : no eye pain [Red Eyes] : eyes not red [Earache] : no earache [Chest Pain] : no chest pain [Loss Of Hearing] : no hearing loss [Palpitations] : no palpitations [Cough] : no cough [SOB on Exertion] : no shortness of breath during exertion [Abdominal Pain] : no abdominal pain [Vomiting] : no vomiting [Constipation] : no constipation [Diarrhea] : no diarrhea [Melena] : no melena [Dysuria] : no dysuria [Incontinence] : no incontinence [Arthralgias] : no arthralgias [Joint Pain] : no joint pain [Confused] : no confusion [Convulsions] : no convulsions

## 2020-01-31 NOTE — ASSESSMENT
[FreeTextEntry1] : This is 65 yo Female patient with PMH of DLD, HTN, DM II, SLE presents for evaluation of screening CRC and dysphagia.\par \par Dysphagia/Odynophagia with heartburn for more than 5yrs.\par Will perform EGD\par c/w omeprazole 40mg daily.\par \par #Screeing CRC\par Will perform colonoscopy\par Golytely, Dulcolax 20mg HS\par ASA class 3, no blood thinners.\par Will get CBC, CMP.\par \par

## 2020-02-12 DIAGNOSIS — R13.10 DYSPHAGIA, UNSPECIFIED: ICD-10-CM

## 2020-02-12 DIAGNOSIS — Z80.0 FAMILY HISTORY OF MALIGNANT NEOPLASM OF DIGESTIVE ORGANS: ICD-10-CM

## 2020-02-12 DIAGNOSIS — Z87.891 PERSONAL HISTORY OF NICOTINE DEPENDENCE: ICD-10-CM

## 2020-03-31 ENCOUNTER — APPOINTMENT (OUTPATIENT)
Dept: INTERNAL MEDICINE | Facility: CLINIC | Age: 65
End: 2020-03-31

## 2020-04-20 ENCOUNTER — RX RENEWAL (OUTPATIENT)
Age: 65
End: 2020-04-20

## 2020-05-07 ENCOUNTER — APPOINTMENT (OUTPATIENT)
Dept: OTOLARYNGOLOGY | Facility: CLINIC | Age: 65
End: 2020-05-07

## 2020-08-04 ENCOUNTER — RESULT REVIEW (OUTPATIENT)
Age: 65
End: 2020-08-04

## 2020-08-04 ENCOUNTER — OUTPATIENT (OUTPATIENT)
Dept: OUTPATIENT SERVICES | Facility: HOSPITAL | Age: 65
LOS: 1 days | Discharge: HOME | End: 2020-08-04
Payer: MEDICAID

## 2020-08-04 DIAGNOSIS — R92.8 OTHER ABNORMAL AND INCONCLUSIVE FINDINGS ON DIAGNOSTIC IMAGING OF BREAST: ICD-10-CM

## 2020-08-04 PROCEDURE — 77061 BREAST TOMOSYNTHESIS UNI: CPT | Mod: 26

## 2020-08-04 PROCEDURE — 77065 DX MAMMO INCL CAD UNI: CPT | Mod: 26,RT

## 2020-08-11 ENCOUNTER — RESULT REVIEW (OUTPATIENT)
Age: 65
End: 2020-08-11

## 2020-08-11 ENCOUNTER — OUTPATIENT (OUTPATIENT)
Dept: OUTPATIENT SERVICES | Facility: HOSPITAL | Age: 65
LOS: 1 days | Discharge: HOME | End: 2020-08-11
Payer: MEDICAID

## 2020-08-11 PROCEDURE — 19082 BX BREAST ADD LESION STRTCTC: CPT | Mod: RT

## 2020-08-11 PROCEDURE — 19081 BX BREAST 1ST LESION STRTCTC: CPT | Mod: RT

## 2020-08-11 PROCEDURE — 88305 TISSUE EXAM BY PATHOLOGIST: CPT | Mod: 26

## 2020-08-12 LAB — SURGICAL PATHOLOGY STUDY: SIGNIFICANT CHANGE UP

## 2020-08-17 DIAGNOSIS — R92.0 MAMMOGRAPHIC MICROCALCIFICATION FOUND ON DIAGNOSTIC IMAGING OF BREAST: ICD-10-CM

## 2020-08-17 DIAGNOSIS — D24.1 BENIGN NEOPLASM OF RIGHT BREAST: ICD-10-CM

## 2020-08-17 DIAGNOSIS — R92.1 MAMMOGRAPHIC CALCIFICATION FOUND ON DIAGNOSTIC IMAGING OF BREAST: ICD-10-CM

## 2020-08-17 DIAGNOSIS — N64.2 ATROPHY OF BREAST: ICD-10-CM

## 2020-08-17 DIAGNOSIS — N60.21 FIBROADENOSIS OF RIGHT BREAST: ICD-10-CM

## 2020-10-19 ENCOUNTER — OUTPATIENT (OUTPATIENT)
Dept: OUTPATIENT SERVICES | Facility: HOSPITAL | Age: 65
LOS: 1 days | Discharge: HOME | End: 2020-10-19

## 2020-10-19 ENCOUNTER — APPOINTMENT (OUTPATIENT)
Dept: INTERNAL MEDICINE | Facility: CLINIC | Age: 65
End: 2020-10-19
Payer: MEDICAID

## 2020-10-19 VITALS
HEART RATE: 86 BPM | TEMPERATURE: 97.5 F | BODY MASS INDEX: 28.88 KG/M2 | DIASTOLIC BLOOD PRESSURE: 71 MMHG | SYSTOLIC BLOOD PRESSURE: 136 MMHG | HEIGHT: 63 IN | WEIGHT: 163 LBS

## 2020-10-19 PROCEDURE — 99213 OFFICE O/P EST LOW 20 MIN: CPT | Mod: GC

## 2020-10-19 RX ORDER — SEMAGLUTIDE 1.34 MG/ML
2 INJECTION, SOLUTION SUBCUTANEOUS
Qty: 1 | Refills: 5 | Status: DISCONTINUED | COMMUNITY
Start: 2020-01-02 | End: 2020-10-19

## 2020-10-19 RX ORDER — ERTUGLIFLOZIN 15 MG/1
15 TABLET, FILM COATED ORAL
Qty: 30 | Refills: 5 | Status: DISCONTINUED | COMMUNITY
Start: 2020-01-02 | End: 2020-10-19

## 2020-10-19 RX ORDER — PIOGLITAZONE HYDROCHLORIDE 30 MG/1
30 TABLET ORAL DAILY
Qty: 90 | Refills: 3 | Status: DISCONTINUED | COMMUNITY
Start: 2018-05-31 | End: 2020-10-19

## 2020-10-19 NOTE — ASSESSMENT
[FreeTextEntry1] : 63 yo F with hx HTN, HLD, DM II, and SLE with proteinuria presents to clinic for follow up. Patient reports that she has some pain in her right upper back (feels like coming from inside of lung) and it happens when swallow water. \par \par Diabetes Mellitus\par - hba1c 6.3 % in 2018.\par - continue with Metformin,victoza\par - repeat HbA1c  now \par - f/u Endocrine as confusion about medication regimen pt states she is only on victoza and metformin while endocrine notes show patient was on steglato and pioglitazone also \par - will give referral for ophthalmology and Podiatry. \par \par Thyroid nodule in Jan 2020\par 2.2 cm - we ordered TSH and endocrinology referral as pt needs US guided biopsy \par \par Dyslipidemia\par - continue Lipitor 40mg daily\par - repeat panel \par \par Heart burn\par - on omeprazole \par - persistent symptoms > 5 years \par - was planned for egd and colono pre-pandemic\par - referral given for EGD and colono\par \par Hypertension\par -controlled \par - continue benazepril, Spironolactone \par \par Pulmonary nodule \par - CT chest Dec 2018: Right upper 2 mm calcified nodule, likely represents a granuloma.\par - former smoker (0.5 pack per day), smoked for 35 years. Quitted in 2013. \par - patient discharged form pulmonary clinic to follow up PRN \par \par SLE with proteinuria \par - on Hydroxychloroquine \par - pt is on benazepril and spironolactone (prescribed by Nephrologist)\par - pt is following Rheumatology. \par \par \par \par HCM: \par - Mammogram Oct 2018: BiRADs 2 (benign). Will refer to mammogram. \par - Flu shot and PPSV 23 given today. \par - Pap smear: patient reports that she had total hysterectomy including cervix in 2006. \par - Last colonoscopy 1/2010 which was unremarkable as per patient. Will refer to GI for colonoscopy and EGD. \par \par rtoin4 months and prn\par \par

## 2020-10-19 NOTE — HISTORY OF PRESENT ILLNESS
[de-identified] : 65 yo F with hx HTN, HLD, DM II, Heartburn and SLE with proteinuria presents to clinic for follow up. Patient is only complaining of her heartburn as it has been worsening , she was supposed to get an endoscopy but then it was cancelled due to the pandemic. Her heartburn improves with omeprazole. Otherwise patient has been doing well. She denies any active complaints. \par Patient poorly compliant last time saw PMD in 2018-will re-order labs and follow up \par \par \par  [FreeTextEntry1] : follow up

## 2020-10-19 NOTE — PHYSICAL EXAM
[No Acute Distress] : no acute distress [Well Developed] : well developed [Well Nourished] : well nourished [Well-Appearing] : well-appearing [EOMI] : extraocular movements intact [Normal Sclera/Conjunctiva] : normal sclera/conjunctiva [No Respiratory Distress] : no respiratory distress  [Supple] : supple [No Accessory Muscle Use] : no accessory muscle use [Normal Rate] : normal rate  [Clear to Auscultation] : lungs were clear to auscultation bilaterally [No Edema] : there was no peripheral edema [Soft] : abdomen soft [Non Tender] : non-tender [Non-distended] : non-distended [No HSM] : no HSM [Normal Bowel Sounds] : normal bowel sounds [No Focal Deficits] : no focal deficits [No Rash] : no rash [Normal Gait] : normal gait

## 2020-10-20 DIAGNOSIS — E78.5 HYPERLIPIDEMIA, UNSPECIFIED: ICD-10-CM

## 2020-10-20 DIAGNOSIS — E04.1 NONTOXIC SINGLE THYROID NODULE: ICD-10-CM

## 2020-10-20 DIAGNOSIS — I10 ESSENTIAL (PRIMARY) HYPERTENSION: ICD-10-CM

## 2020-10-20 DIAGNOSIS — E11.9 TYPE 2 DIABETES MELLITUS WITHOUT COMPLICATIONS: ICD-10-CM

## 2020-10-20 DIAGNOSIS — R12 HEARTBURN: ICD-10-CM

## 2020-10-22 ENCOUNTER — OUTPATIENT (OUTPATIENT)
Dept: OUTPATIENT SERVICES | Facility: HOSPITAL | Age: 65
LOS: 1 days | Discharge: HOME | End: 2020-10-22
Payer: COMMERCIAL

## 2020-10-22 ENCOUNTER — APPOINTMENT (OUTPATIENT)
Dept: OPHTHALMOLOGY | Facility: CLINIC | Age: 65
End: 2020-10-22

## 2020-10-22 PROCEDURE — 92004 COMPRE OPH EXAM NEW PT 1/>: CPT

## 2020-10-22 PROCEDURE — 92250 FUNDUS PHOTOGRAPHY W/I&R: CPT | Mod: 26

## 2020-10-23 DIAGNOSIS — H25.813 COMBINED FORMS OF AGE-RELATED CATARACT, BILATERAL: ICD-10-CM

## 2020-10-23 DIAGNOSIS — I10 ESSENTIAL (PRIMARY) HYPERTENSION: ICD-10-CM

## 2020-10-23 DIAGNOSIS — H52.4 PRESBYOPIA: ICD-10-CM

## 2020-10-23 DIAGNOSIS — Z79.899 OTHER LONG TERM (CURRENT) DRUG THERAPY: ICD-10-CM

## 2020-10-23 DIAGNOSIS — E11.9 TYPE 2 DIABETES MELLITUS WITHOUT COMPLICATIONS: ICD-10-CM

## 2020-11-06 LAB
25(OH)D3 SERPL-MCNC: 34 NG/ML
ALBUMIN SERPL ELPH-MCNC: 4.4 G/DL
ALP BLD-CCNC: 83 U/L
ALT SERPL-CCNC: 142 U/L
ANION GAP SERPL CALC-SCNC: 15 MMOL/L
AST SERPL-CCNC: 66 U/L
BASOPHILS # BLD AUTO: 0.02 K/UL
BASOPHILS NFR BLD AUTO: 0.4 %
BILIRUB SERPL-MCNC: 0.5 MG/DL
BUN SERPL-MCNC: 13 MG/DL
CALCIUM SERPL-MCNC: 10.4 MG/DL
CHLORIDE SERPL-SCNC: 98 MMOL/L
CHOLEST SERPL-MCNC: 299 MG/DL
CO2 SERPL-SCNC: 25 MMOL/L
CREAT SERPL-MCNC: 0.9 MG/DL
EOSINOPHIL # BLD AUTO: 0.02 K/UL
EOSINOPHIL NFR BLD AUTO: 0.4 %
ESTIMATED AVERAGE GLUCOSE: 252 MG/DL
GLUCOSE SERPL-MCNC: 159 MG/DL
HBA1C MFR BLD HPLC: 10.4 %
HCT VFR BLD CALC: 44.4 %
HDLC SERPL-MCNC: 52 MG/DL
HGB BLD-MCNC: 14.6 G/DL
IMM GRANULOCYTES NFR BLD AUTO: 0.2 %
LDLC SERPL CALC-MCNC: 220 MG/DL
LYMPHOCYTES # BLD AUTO: 2.21 K/UL
LYMPHOCYTES NFR BLD AUTO: 48 %
MAN DIFF?: NORMAL
MCHC RBC-ENTMCNC: 30.2 PG
MCHC RBC-ENTMCNC: 32.9 G/DL
MCV RBC AUTO: 91.7 FL
MONOCYTES # BLD AUTO: 0.29 K/UL
MONOCYTES NFR BLD AUTO: 6.3 %
NEUTROPHILS # BLD AUTO: 2.05 K/UL
NEUTROPHILS NFR BLD AUTO: 44.7 %
NONHDLC SERPL-MCNC: 247 MG/DL
PLATELET # BLD AUTO: 330 K/UL
POTASSIUM SERPL-SCNC: 4.8 MMOL/L
PROT SERPL-MCNC: 7.6 G/DL
RBC # BLD: 4.84 M/UL
RBC # FLD: 13.2 %
SODIUM SERPL-SCNC: 138 MMOL/L
TRIGL SERPL-MCNC: 136 MG/DL
WBC # FLD AUTO: 4.6 K/UL

## 2020-11-07 LAB — TSH SERPL-ACNC: 1.93 UIU/ML

## 2020-11-11 ENCOUNTER — APPOINTMENT (OUTPATIENT)
Dept: ENDOCRINOLOGY | Facility: CLINIC | Age: 65
End: 2020-11-11

## 2020-11-11 ENCOUNTER — OUTPATIENT (OUTPATIENT)
Dept: OUTPATIENT SERVICES | Facility: HOSPITAL | Age: 65
LOS: 1 days | Discharge: HOME | End: 2020-11-11

## 2020-11-11 RX ORDER — BLOOD SUGAR DIAGNOSTIC
STRIP MISCELLANEOUS 3 TIMES DAILY
Qty: 100 | Refills: 5 | Status: ACTIVE | COMMUNITY
Start: 2017-03-01 | End: 1900-01-01

## 2020-11-11 RX ORDER — LIRAGLUTIDE 6 MG/ML
18 INJECTION SUBCUTANEOUS DAILY
Refills: 0 | Status: DISCONTINUED | COMMUNITY
Start: 2020-10-19 | End: 2020-11-11

## 2020-11-11 NOTE — ASSESSMENT
[FreeTextEntry1] : try ozempic, and pioglitazone, unclear whether patient takes meds. [Diabetes Foot Care] : diabetes foot care [Long Term Vascular Complications] : long term vascular complications of diabetes [Carbohydrate Consistent Diet] : carbohydrate consistent diet [Importance of Diet and Exercise] : importance of diet and exercise to improve glycemic control, achieve weight loss and improve cardiovascular health [Retinopathy Screening] : Patient was referred to ophthalmology for retinopathy screening

## 2020-11-11 NOTE — HISTORY OF PRESENT ILLNESS
[FreeTextEntry1] : unclear what is happening, unclear whether taking medications, non compliant, not seen for 10 months.

## 2020-11-24 ENCOUNTER — APPOINTMENT (OUTPATIENT)
Dept: GASTROENTEROLOGY | Facility: CLINIC | Age: 65
End: 2020-11-24
Payer: MEDICAID

## 2020-11-24 ENCOUNTER — OUTPATIENT (OUTPATIENT)
Dept: OUTPATIENT SERVICES | Facility: HOSPITAL | Age: 65
LOS: 1 days | Discharge: HOME | End: 2020-11-24

## 2020-11-24 VITALS
HEIGHT: 63 IN | SYSTOLIC BLOOD PRESSURE: 118 MMHG | TEMPERATURE: 95.7 F | HEART RATE: 93 BPM | WEIGHT: 167 LBS | BODY MASS INDEX: 29.59 KG/M2 | DIASTOLIC BLOOD PRESSURE: 75 MMHG

## 2020-11-24 PROCEDURE — 99214 OFFICE O/P EST MOD 30 MIN: CPT

## 2020-11-24 NOTE — ASSESSMENT
[FreeTextEntry1] :  This is 66 yo Female patient with PMH of DLD, HTN, poorly controlled DM II, SLE \par initially seen for dysphagia and odynophagia and for screening CRC scheduled for EGD and colonoscopy which were not done yet. \par Pt still c/o back pain when swallowing and difficulty in swallowing associated with GERD symptoms for past 8 months. Takes Omeprazole once daily with partial relief. \par No family h/o GI malignancy, no overt gi bleed, no weight loss \par No recent colonoscopy \par \par #) Odynophagia/ Dyspahagia with GERD \par Peptic stricture vs esophagitis (? candida - given poorly controlled DM)  vs others including malignancy\par Rec \par Antireflux measures \par PPI BID \par EGD \par \par #) Average Risk for CRC \par no weight loss, no anemia, no overt gi bleed \par Rec \par Screening colonoscopy \par \par #) Elevated Liver enzymes - hepatocellular, ALT > AST \par normal T.raj and ALP \par Possible NAFLD vs auto immune given SLE vs DILI vs others \par Rec \par Lifestyle modification \par Better control of DM \par Trend LFT \par Check INR \par CLD work up - viral hepatitis / auto immune hepatitis / iron studies and ceruloplasmin \par RUQ sonogram \par avoid alcohol \par  \par

## 2020-11-24 NOTE — HISTORY OF PRESENT ILLNESS
[Heartburn] : stable heartburn [Nausea] : denies nausea [Vomiting] : denies vomiting [Diarrhea] : denies diarrhea [Constipation] : denies constipation [Yellow Skin Or Eyes (Jaundice)] : denies jaundice [Abdominal Pain] : denies abdominal pain [Abdominal Swelling] : denies abdominal swelling [Rectal Pain] : denies rectal pain [Fair Compliance] : fair compliance with treatment [Wt Loss ___ Lbs] : no recent weight loss [de-identified] :  This is 66 yo Female patient with PMH of DLD, HTN, poorly controlled DM II, SLE \par initially seen for dysphagia and odynophagia and for screening CRC:  scheduled for EGD and colonoscopy which were not done yet. \par Pt still c/o back pain when swallowing and difficulty in swallowing associated with GERD symptoms for past 8 months. Takes Omeprazole once daily with partial relief. \par No family h/o GI malignancy, no overt gi bleed, no weight loss \par No recent colonoscopy \par \par The patient denies rectal bleeding, melena, diarrhea, constipation, change in bowel habits, change in stool caliber, weight loss,change in appetite, nausea, vomiting,  early satiety, abdominal pain, fever or chills.\par \par \par

## 2020-11-27 DIAGNOSIS — Z12.11 ENCOUNTER FOR SCREENING FOR MALIGNANT NEOPLASM OF COLON: ICD-10-CM

## 2020-11-27 DIAGNOSIS — R74.8 ABNORMAL LEVELS OF OTHER SERUM ENZYMES: ICD-10-CM

## 2020-11-27 DIAGNOSIS — R13.10 DYSPHAGIA, UNSPECIFIED: ICD-10-CM

## 2020-12-01 ENCOUNTER — LABORATORY RESULT (OUTPATIENT)
Age: 65
End: 2020-12-01

## 2020-12-01 ENCOUNTER — OUTPATIENT (OUTPATIENT)
Dept: OUTPATIENT SERVICES | Facility: HOSPITAL | Age: 65
LOS: 1 days | Discharge: HOME | End: 2020-12-01

## 2020-12-01 DIAGNOSIS — Z11.59 ENCOUNTER FOR SCREENING FOR OTHER VIRAL DISEASES: ICD-10-CM

## 2020-12-04 ENCOUNTER — OUTPATIENT (OUTPATIENT)
Dept: OUTPATIENT SERVICES | Facility: HOSPITAL | Age: 65
LOS: 1 days | Discharge: HOME | End: 2020-12-04
Payer: MEDICAID

## 2020-12-04 ENCOUNTER — TRANSCRIPTION ENCOUNTER (OUTPATIENT)
Age: 65
End: 2020-12-04

## 2020-12-04 ENCOUNTER — RESULT REVIEW (OUTPATIENT)
Age: 65
End: 2020-12-04

## 2020-12-04 VITALS
TEMPERATURE: 98 F | HEIGHT: 63 IN | HEART RATE: 98 BPM | WEIGHT: 167.99 LBS | SYSTOLIC BLOOD PRESSURE: 125 MMHG | DIASTOLIC BLOOD PRESSURE: 75 MMHG | RESPIRATION RATE: 18 BRPM

## 2020-12-04 VITALS
HEART RATE: 75 BPM | SYSTOLIC BLOOD PRESSURE: 120 MMHG | RESPIRATION RATE: 17 BRPM | DIASTOLIC BLOOD PRESSURE: 61 MMHG | OXYGEN SATURATION: 99 %

## 2020-12-04 DIAGNOSIS — Z12.11 ENCOUNTER FOR SCREENING FOR MALIGNANT NEOPLASM OF COLON: ICD-10-CM

## 2020-12-04 DIAGNOSIS — Z90.49 ACQUIRED ABSENCE OF OTHER SPECIFIED PARTS OF DIGESTIVE TRACT: Chronic | ICD-10-CM

## 2020-12-04 DIAGNOSIS — Z90.710 ACQUIRED ABSENCE OF BOTH CERVIX AND UTERUS: Chronic | ICD-10-CM

## 2020-12-04 DIAGNOSIS — Z98.890 OTHER SPECIFIED POSTPROCEDURAL STATES: Chronic | ICD-10-CM

## 2020-12-04 PROCEDURE — 88305 TISSUE EXAM BY PATHOLOGIST: CPT | Mod: 26

## 2020-12-04 PROCEDURE — 45378 DIAGNOSTIC COLONOSCOPY: CPT | Mod: XS

## 2020-12-04 PROCEDURE — 43239 EGD BIOPSY SINGLE/MULTIPLE: CPT | Mod: XS

## 2020-12-04 PROCEDURE — 88312 SPECIAL STAINS GROUP 1: CPT | Mod: 26

## 2020-12-04 NOTE — CHART NOTE - NSCHARTNOTEFT_GEN_A_CORE
PACU ANESTHESIA ADMISSION NOTE      Procedure:   Post op diagnosis:      ____  Intubated  TV:______       Rate: ______      FiO2: ______    __x__  Patent Airway    __x__  Full return of protective reflexes    __x__  Full recovery from anesthesia / back to baseline status    Vitals  HR: 94  BP: 110/60  RR: 18  O2 Sat: 99  Temp: na    Mental Status:  __x__ Awake   ___x__ Alert   _____ Drowsy   _____ Sedated    Nausea/Vomiting:  __x__ NO  ______Yes,   See Post - Op Orders          Pain Scale (0-10):  _____    Treatment: ____ None    __x__ See Post - Op/PCA Orders    Post - Operative Fluids:   ____ Oral   __x__ See Post - Op Orders    Plan: Discharge when criteria met:   __x__Home       _____Floor     _____Critical Care   Other:_________________    Comments: Patient had smooth intraoperative event, no anesthesia complication.

## 2020-12-04 NOTE — H&P PST ADULT - HISTORY OF PRESENT ILLNESS
66 yo Female patient with PMH of DLD, HTN, poorly controlled DM II, SLE   initially seen for dysphagia and odynophagia and for screening CRC scheduled for EGD and colonoscopy which were not done yet.   Pt still c/o back pain when swallowing and difficulty in swallowing associated with GERD symptoms for past 8 months. Takes Omeprazole once daily with partial relief.   No family h/o GI malignancy, no overt gi bleed, no weight loss   No recent colonoscopy     #) Odynophagia/ Dyspahagia with GERD   Peptic stricture vs esophagitis (? candida - given poorly controlled DM) vs others including malignancy  Rec   Antireflux measures   PPI BID   EGD     #) Average Risk for CRC   no weight loss, no anemia, no overt gi bleed   Rec   Screening colonoscopy

## 2020-12-08 LAB — SURGICAL PATHOLOGY STUDY: SIGNIFICANT CHANGE UP

## 2020-12-09 DIAGNOSIS — E11.9 TYPE 2 DIABETES MELLITUS WITHOUT COMPLICATIONS: ICD-10-CM

## 2020-12-09 DIAGNOSIS — K20.90 ESOPHAGITIS, UNSPECIFIED WITHOUT BLEEDING: ICD-10-CM

## 2020-12-09 DIAGNOSIS — Z79.84 LONG TERM (CURRENT) USE OF ORAL HYPOGLYCEMIC DRUGS: ICD-10-CM

## 2020-12-09 DIAGNOSIS — K64.8 OTHER HEMORRHOIDS: ICD-10-CM

## 2020-12-09 DIAGNOSIS — I10 ESSENTIAL (PRIMARY) HYPERTENSION: ICD-10-CM

## 2020-12-09 DIAGNOSIS — K31.9 DISEASE OF STOMACH AND DUODENUM, UNSPECIFIED: ICD-10-CM

## 2020-12-09 DIAGNOSIS — E78.00 PURE HYPERCHOLESTEROLEMIA, UNSPECIFIED: ICD-10-CM

## 2020-12-10 PROBLEM — K21.9 GASTRO-ESOPHAGEAL REFLUX DISEASE WITHOUT ESOPHAGITIS: Chronic | Status: ACTIVE | Noted: 2020-12-04

## 2020-12-10 PROBLEM — I10 ESSENTIAL (PRIMARY) HYPERTENSION: Chronic | Status: ACTIVE | Noted: 2020-12-04

## 2020-12-10 PROBLEM — E78.00 PURE HYPERCHOLESTEROLEMIA, UNSPECIFIED: Chronic | Status: ACTIVE | Noted: 2020-12-04

## 2020-12-10 PROBLEM — M32.9 SYSTEMIC LUPUS ERYTHEMATOSUS, UNSPECIFIED: Chronic | Status: ACTIVE | Noted: 2020-12-04

## 2020-12-10 PROBLEM — E11.9 TYPE 2 DIABETES MELLITUS WITHOUT COMPLICATIONS: Chronic | Status: ACTIVE | Noted: 2020-12-04

## 2020-12-30 ENCOUNTER — NON-APPOINTMENT (OUTPATIENT)
Age: 65
End: 2020-12-30

## 2021-01-08 ENCOUNTER — APPOINTMENT (OUTPATIENT)
Dept: GASTROENTEROLOGY | Facility: CLINIC | Age: 66
End: 2021-01-08

## 2021-01-08 ENCOUNTER — APPOINTMENT (OUTPATIENT)
Dept: GASTROENTEROLOGY | Facility: CLINIC | Age: 66
End: 2021-01-08
Payer: MEDICAID

## 2021-01-08 ENCOUNTER — OUTPATIENT (OUTPATIENT)
Dept: OUTPATIENT SERVICES | Facility: HOSPITAL | Age: 66
LOS: 1 days | Discharge: HOME | End: 2021-01-08

## 2021-01-08 VITALS
HEIGHT: 63 IN | TEMPERATURE: 97.9 F | BODY MASS INDEX: 28.7 KG/M2 | WEIGHT: 162 LBS | HEART RATE: 98 BPM | DIASTOLIC BLOOD PRESSURE: 82 MMHG | SYSTOLIC BLOOD PRESSURE: 133 MMHG

## 2021-01-08 DIAGNOSIS — Z90.710 ACQUIRED ABSENCE OF BOTH CERVIX AND UTERUS: Chronic | ICD-10-CM

## 2021-01-08 DIAGNOSIS — Z98.890 OTHER SPECIFIED POSTPROCEDURAL STATES: Chronic | ICD-10-CM

## 2021-01-08 DIAGNOSIS — R13.10 DYSPHAGIA, UNSPECIFIED: ICD-10-CM

## 2021-01-08 DIAGNOSIS — Z90.49 ACQUIRED ABSENCE OF OTHER SPECIFIED PARTS OF DIGESTIVE TRACT: Chronic | ICD-10-CM

## 2021-01-08 PROCEDURE — 99214 OFFICE O/P EST MOD 30 MIN: CPT

## 2021-01-08 NOTE — REVIEW OF SYSTEMS
[Fever] : no fever [Chills] : no chills [Feeling Poorly] : not feeling poorly [Feeling Tired] : not feeling tired [Recent Weight Gain (___ Lbs)] : no recent weight gain [Recent Weight Loss (___ Lbs)] : no recent weight loss [Eye Pain] : no eye pain [Earache] : no earache [Chest Pain] : no chest pain [Palpitations] : no palpitations [Shortness Of Breath] : no shortness of breath [Cough] : no cough [SOB on Exertion] : no shortness of breath during exertion [Arthralgias] : no arthralgias [Confused] : no confusion

## 2021-01-08 NOTE — HISTORY OF PRESENT ILLNESS
[de-identified] : 66 yo Female patient with PMH of DLD, HTN, poorly controlled DM II, SLE  initially seen for dysphagia and odynophagia and for screening CRC , had EGD and colonoscopy in dec 2020\par EGD; \par Normal mucosa in the middle third of the esophagus and lower third of the \par esophagus. (Biopsy).   No EOE\par  Erythema in the antrum compatible with non-erosive gastritis. (Biopsy).  No H. pylori \par  Polyps in the fundus.  \par  Normal mucosa in the whole examined duodenum.  \par Colonoscopy : \par Otherwise normal colon.  \par  Internal hemorrhoids.  \par Patient still complain of intermittent dysphagia , no weight loss , no melena , fresh blood per rectum or hematemesis . No constipation or diarrhea . Patient was see for elevated liver enzymes , CLD workup was ordered ,but patient did not do it , she still have the script with her \par  \par  \par

## 2021-01-08 NOTE — PHYSICAL EXAM
[General Appearance - Alert] : alert [General Appearance - Well Nourished] : well nourished [General Appearance - Well Developed] : well developed [General Appearance - Well-Appearing] : healthy appearing [Sclera] : the sclera and conjunctiva were normal [Outer Ear] : the ears and nose were normal in appearance [Neck Appearance] : the appearance of the neck was normal [Respiration, Rhythm And Depth] : normal respiratory rhythm and effort [Exaggerated Use Of Accessory Muscles For Inspiration] : no accessory muscle use [Auscultation Breath Sounds / Voice Sounds] : lungs were clear to auscultation bilaterally [Apical Impulse] : the apical impulse was normal [Heart Sounds] : normal S1 and S2 [Bowel Sounds] : normal bowel sounds [Abdomen Soft] : soft [Abdomen Tenderness] : non-tender [] : no hepato-splenomegaly [No CVA Tenderness] : no ~M costovertebral angle tenderness [Skin Color & Pigmentation] : normal skin color and pigmentation [Oriented To Time, Place, And Person] : oriented to person, place, and time

## 2021-01-13 DIAGNOSIS — Z12.11 ENCOUNTER FOR SCREENING FOR MALIGNANT NEOPLASM OF COLON: ICD-10-CM

## 2021-01-13 DIAGNOSIS — R13.10 DYSPHAGIA, UNSPECIFIED: ICD-10-CM

## 2021-01-13 DIAGNOSIS — Z87.19 PERSONAL HISTORY OF OTHER DISEASES OF THE DIGESTIVE SYSTEM: ICD-10-CM

## 2021-01-13 DIAGNOSIS — R74.8 ABNORMAL LEVELS OF OTHER SERUM ENZYMES: ICD-10-CM

## 2021-01-15 ENCOUNTER — OUTPATIENT (OUTPATIENT)
Dept: OUTPATIENT SERVICES | Facility: HOSPITAL | Age: 66
LOS: 1 days | Discharge: HOME | End: 2021-01-15
Payer: MEDICAID

## 2021-01-15 DIAGNOSIS — Z90.710 ACQUIRED ABSENCE OF BOTH CERVIX AND UTERUS: Chronic | ICD-10-CM

## 2021-01-15 DIAGNOSIS — Z90.49 ACQUIRED ABSENCE OF OTHER SPECIFIED PARTS OF DIGESTIVE TRACT: Chronic | ICD-10-CM

## 2021-01-15 DIAGNOSIS — Z98.890 OTHER SPECIFIED POSTPROCEDURAL STATES: Chronic | ICD-10-CM

## 2021-01-15 DIAGNOSIS — R74.8 ABNORMAL LEVELS OF OTHER SERUM ENZYMES: ICD-10-CM

## 2021-01-15 PROCEDURE — 76705 ECHO EXAM OF ABDOMEN: CPT | Mod: 26

## 2021-01-23 ENCOUNTER — RX RENEWAL (OUTPATIENT)
Age: 66
End: 2021-01-23

## 2021-02-22 ENCOUNTER — APPOINTMENT (OUTPATIENT)
Dept: OPHTHALMOLOGY | Facility: CLINIC | Age: 66
End: 2021-02-22

## 2021-04-11 ENCOUNTER — RX RENEWAL (OUTPATIENT)
Age: 66
End: 2021-04-11

## 2021-04-12 ENCOUNTER — NON-APPOINTMENT (OUTPATIENT)
Age: 66
End: 2021-04-12

## 2021-04-20 ENCOUNTER — APPOINTMENT (OUTPATIENT)
Dept: OPHTHALMOLOGY | Facility: CLINIC | Age: 66
End: 2021-04-20

## 2021-04-22 ENCOUNTER — APPOINTMENT (OUTPATIENT)
Dept: INTERNAL MEDICINE | Facility: CLINIC | Age: 66
End: 2021-04-22
Payer: MEDICAID

## 2021-04-22 ENCOUNTER — OUTPATIENT (OUTPATIENT)
Dept: OUTPATIENT SERVICES | Facility: HOSPITAL | Age: 66
LOS: 1 days | Discharge: HOME | End: 2021-04-22

## 2021-04-22 VITALS
SYSTOLIC BLOOD PRESSURE: 118 MMHG | TEMPERATURE: 97.7 F | DIASTOLIC BLOOD PRESSURE: 75 MMHG | HEIGHT: 63 IN | HEART RATE: 85 BPM | WEIGHT: 169 LBS | OXYGEN SATURATION: 97 % | BODY MASS INDEX: 29.95 KG/M2

## 2021-04-22 DIAGNOSIS — Z98.890 OTHER SPECIFIED POSTPROCEDURAL STATES: Chronic | ICD-10-CM

## 2021-04-22 DIAGNOSIS — Z90.710 ACQUIRED ABSENCE OF BOTH CERVIX AND UTERUS: Chronic | ICD-10-CM

## 2021-04-22 DIAGNOSIS — Z90.49 ACQUIRED ABSENCE OF OTHER SPECIFIED PARTS OF DIGESTIVE TRACT: Chronic | ICD-10-CM

## 2021-04-22 PROCEDURE — 99213 OFFICE O/P EST LOW 20 MIN: CPT | Mod: GC

## 2021-04-22 NOTE — ASSESSMENT
[FreeTextEntry1] : 63 yo F with hx HTN, HLD, DM II, and SLE with proteinuria presents to clinic for follow up. \par \par Diabetes Mellitus\par - hba1c 10.4 11/2020\par - started on ozempic and pioglitazone. \par - repeat HbA1c  now \par - f/u Endocrine Dr. Duncan\par - optometry exam was 5 months \par - podiatry referral \par \par Thyroid nodule in Jan 2020\par 2.2 cm - we ordered TSH and endocrinology referral as pt needs US guided biopsy \par - US shows TRADS 4- will need repeat US now and 2,3,5 years. \par - did not get a us guided biopsy . will need a biopsy \par - fu endocrinology for follow up \par - ENT refferal \par \par Dyslipidemia\par - \par - increase Lipitor to 80mg daily\par - repeat panel \par \par Heart burn\par - continue omeprazole \par - persistent symptoms > 5 years \par - had EGD and colono 2020- no abnormal findings \par \par hepatic steatosis\par - AST 66, \par - f/u labs\par - Fu GI doctor \par \par Hypertension\par -controlled \par - continue benazepril, Spironolactone \par \par Pulmonary nodule \par - CT chest Dec 2018: Right upper 2 mm calcified nodule, likely represents a granuloma.\par - former smoker (0.5 pack per day), smoked for 35 years. Quit in 2013. \par - would like to follow up with pulm\par \par SLE with proteinuria \par - on Hydroxychloroquine \par - pt is on benazepril and spironolactone (prescribed by Nephrologist)\par - pt is following Rheumatology. \par \par HCM: \par - Mammogram 2020 birads 4- biopsy was benign. F/u mammo.\par - Pap smear: patient reports that she had total hysterectomy including cervix in 2006. \par - Last colonoscopy 2020 normal. EGD- hiatal hernia\par \par return 6 months and prn\par \par

## 2021-04-22 NOTE — PHYSICAL EXAM
[Well Nourished] : well nourished [Well Developed] : well developed [Well-Appearing] : well-appearing [Normal Sclera/Conjunctiva] : normal sclera/conjunctiva [EOMI] : extraocular movements intact [Supple] : supple [No Respiratory Distress] : no respiratory distress  [No Accessory Muscle Use] : no accessory muscle use [Clear to Auscultation] : lungs were clear to auscultation bilaterally [Normal Rate] : normal rate  [No Edema] : there was no peripheral edema [Soft] : abdomen soft [Non Tender] : non-tender [Non-distended] : non-distended [No HSM] : no HSM [Normal Bowel Sounds] : normal bowel sounds [No Rash] : no rash [No Focal Deficits] : no focal deficits [Normal Gait] : normal gait

## 2021-04-22 NOTE — HISTORY OF PRESENT ILLNESS
[FreeTextEntry1] : follow up [de-identified] : 65 yo F with hx HTN, HLD, DM II, Heartburn and SLE with proteinuria presents to clinic for follow up.\par \par \par

## 2021-04-28 DIAGNOSIS — R74.8 ABNORMAL LEVELS OF OTHER SERUM ENZYMES: ICD-10-CM

## 2021-04-28 DIAGNOSIS — E04.1 NONTOXIC SINGLE THYROID NODULE: ICD-10-CM

## 2021-04-28 DIAGNOSIS — E78.5 HYPERLIPIDEMIA, UNSPECIFIED: ICD-10-CM

## 2021-04-28 DIAGNOSIS — I10 ESSENTIAL (PRIMARY) HYPERTENSION: ICD-10-CM

## 2021-04-28 DIAGNOSIS — R12 HEARTBURN: ICD-10-CM

## 2021-04-28 DIAGNOSIS — E11.9 TYPE 2 DIABETES MELLITUS WITHOUT COMPLICATIONS: ICD-10-CM

## 2021-04-28 DIAGNOSIS — Z00.00 ENCOUNTER FOR GENERAL ADULT MEDICAL EXAMINATION WITHOUT ABNORMAL FINDINGS: ICD-10-CM

## 2021-04-28 DIAGNOSIS — M32.9 SYSTEMIC LUPUS ERYTHEMATOSUS, UNSPECIFIED: ICD-10-CM

## 2021-04-28 DIAGNOSIS — R92.8 OTHER ABNORMAL AND INCONCLUSIVE FINDINGS ON DIAGNOSTIC IMAGING OF BREAST: ICD-10-CM

## 2021-04-28 DIAGNOSIS — R91.1 SOLITARY PULMONARY NODULE: ICD-10-CM

## 2021-05-13 ENCOUNTER — OUTPATIENT (OUTPATIENT)
Dept: OUTPATIENT SERVICES | Facility: HOSPITAL | Age: 66
LOS: 1 days | Discharge: HOME | End: 2021-05-13

## 2021-05-13 ENCOUNTER — APPOINTMENT (OUTPATIENT)
Dept: ENDOCRINOLOGY | Facility: CLINIC | Age: 66
End: 2021-05-13

## 2021-05-13 ENCOUNTER — APPOINTMENT (OUTPATIENT)
Dept: NUTRITION | Facility: CLINIC | Age: 66
End: 2021-05-13

## 2021-05-13 VITALS — SYSTOLIC BLOOD PRESSURE: 120 MMHG | DIASTOLIC BLOOD PRESSURE: 74 MMHG | HEART RATE: 82 BPM

## 2021-05-13 DIAGNOSIS — E04.1 NONTOXIC SINGLE THYROID NODULE: ICD-10-CM

## 2021-05-13 DIAGNOSIS — Z98.890 OTHER SPECIFIED POSTPROCEDURAL STATES: Chronic | ICD-10-CM

## 2021-05-13 DIAGNOSIS — Z90.710 ACQUIRED ABSENCE OF BOTH CERVIX AND UTERUS: Chronic | ICD-10-CM

## 2021-05-13 DIAGNOSIS — Z90.49 ACQUIRED ABSENCE OF OTHER SPECIFIED PARTS OF DIGESTIVE TRACT: Chronic | ICD-10-CM

## 2021-05-13 DIAGNOSIS — E66.01 MORBID (SEVERE) OBESITY DUE TO EXCESS CALORIES: ICD-10-CM

## 2021-05-13 DIAGNOSIS — E11.9 TYPE 2 DIABETES MELLITUS WITHOUT COMPLICATIONS: ICD-10-CM

## 2021-05-13 NOTE — HISTORY OF PRESENT ILLNESS
[FreeTextEntry1] : Taking ozempic, metformin, pioglitazone, steglatro currently\par Last a1c 10.4 in Nov 2020\par Lost 10 lbs since september\par -140 at home as per pt. Marieetrenton dunlapot have a log book

## 2021-05-19 DIAGNOSIS — E11.65 TYPE 2 DIABETES MELLITUS WITH HYPERGLYCEMIA: ICD-10-CM

## 2021-06-03 ENCOUNTER — APPOINTMENT (OUTPATIENT)
Dept: OTOLARYNGOLOGY | Facility: CLINIC | Age: 66
End: 2021-06-03
Payer: MEDICAID

## 2021-06-03 DIAGNOSIS — R12 HEARTBURN: ICD-10-CM

## 2021-06-03 PROCEDURE — 99203 OFFICE O/P NEW LOW 30 MIN: CPT

## 2021-06-03 NOTE — ASSESSMENT
[FreeTextEntry1] : - will proceed with FNA of right midpole thyroid nodule, 2.2cm\par - f/up after FNA

## 2021-06-03 NOTE — DATA REVIEWED
[de-identified] : relevant images and reports personally reviewed by me:\par \par EXAM: US THYROID PARATHYROID \par \par \par PROCEDURE DATE: 01/28/2020 \par \par \par \par \par INTERPRETATION: Clinical history: Thyroid nodule measuring 1.4 cm. \par \par Technique: Thyroid sonogram. \par \par Comparison: Correlation made with chest CT dated 12/6/2018. \par \par Findings: \par \par Echotexture: Homogeneous. \par \par Thyroid size: \par \par Right lobe: 4.6 x 2.0 x 1.7 cm \par Left lobe: 4.6 x 1.2 x 1.2 cm \par Isthmus: 0.3 cm \par \par Vascularity: Normal. \par \par Nodule number: 1 \par Size: 2.2 x 1.9 x 1.4 cm; Location: right; mid pole. \par \par Composition: spongiform (0) \par Echogenicity: hypoechoic (2) \par Shape: wider-than-tall (0) \par Margins: smooth (0) \par Echogenic foci: punctate echogenic foci (3) \par \par ACR TI-RADS risk category: TR4 (4-6 points) \par \par Other: There are multiple other subcentimeter nodules up to 0.4 cm without \par suspicious features. \par \par Impression: \par \par Nodule 1: 2.2 cm, right midpole, TR 4 - FNA if = 1.5cm, follow-up if 1 - 1.4 \par cm. \par \par Recommendation: Ultrasound-guided fine-needle aspiration. \par \par \par \par ACR TI-RADS recommendation 2017: \par TR5 - FNA if ? 1cm, follow-up if 0.5 - 0.9 cm every year for 5 years \par TR4 - FNA if ? 1.5cm, follow-up if 1 - 1.4 cm in 1, 2, 3 and 5 years \par TR3 - FNA if ? 2.5cm, follow-up if 1.5 - 2.4 cm in 1, 3 and 5 years \par TR2 (2 points) & TR1 (0 points) - No FNA or follow-up \par \par \par \par \par \par \par \par \par \par

## 2021-06-03 NOTE — CONSULT LETTER
[Dear  ___] : Dear  [unfilled], [Consult Letter:] : I had the pleasure of evaluating your patient, [unfilled]. [Please see my note below.] : Please see my note below. [Consult Closing:] : Thank you very much for allowing me to participate in the care of this patient.  If you have any questions, please do not hesitate to contact me. [Sincerely,] : Sincerely, [FreeTextEntry2] : Brittany Black MD [FreeTextEntry3] : Christine Nassar MD\par Otolaryngology - Head & Neck Surgery\par

## 2021-06-03 NOTE — HISTORY OF PRESENT ILLNESS
[de-identified] : Patient presents today due to recent diagnosis of  thyroid nodule. Dr Black, her PCP, ordered thyroid sonogram as part of routine exam from 1/2020. Delay in addressing this due to COVID pandemic. Patient denies any symptoms. No compressive symptoms. No FNA performed. No family history of thyroid conditions. No dysphagia. No choking. She has a h/o GERD and takes Omeprazole. Thyroid u/s shows 2.2cm right mid-pole thyroid nodule. Older sister has thyroid nodules, no hx family thyroid cancer. Denies voice changes, hoarse voice.

## 2021-06-11 ENCOUNTER — APPOINTMENT (OUTPATIENT)
Dept: PULMONOLOGY | Facility: CLINIC | Age: 66
End: 2021-06-11

## 2021-06-18 ENCOUNTER — RX RENEWAL (OUTPATIENT)
Age: 66
End: 2021-06-18

## 2021-07-22 ENCOUNTER — APPOINTMENT (OUTPATIENT)
Dept: OTOLARYNGOLOGY | Facility: CLINIC | Age: 66
End: 2021-07-22

## 2021-08-04 ENCOUNTER — LABORATORY RESULT (OUTPATIENT)
Age: 66
End: 2021-08-04

## 2021-08-20 ENCOUNTER — NON-APPOINTMENT (OUTPATIENT)
Age: 66
End: 2021-08-20

## 2021-09-09 ENCOUNTER — RX RENEWAL (OUTPATIENT)
Age: 66
End: 2021-09-09

## 2021-09-30 ENCOUNTER — APPOINTMENT (OUTPATIENT)
Dept: OTOLARYNGOLOGY | Facility: CLINIC | Age: 66
End: 2021-09-30
Payer: MEDICARE

## 2021-09-30 PROCEDURE — 99213 OFFICE O/P EST LOW 20 MIN: CPT

## 2021-09-30 NOTE — DATA REVIEWED
[de-identified] : PASQUALE RICCI                          1\par \par \par \par                  Cytopathology Report\par \par \par \par \par \par           Final Diagnosis\par           THYROID NODULE, RIGHT MID, US-GUIDED FNA:\par           - NO MALIGNANT CELLS IDENTIFIED.\par           - Histiocytes and few benign follicular cells, consistent with\par           colloid nodule with cystic degeneration ([place] category II).\par           - Cell block examined.\par \par           Screened by: Nella ACEVEDO(ASCP)\par           Verified by: Carl Rodrigues M.D.\par           (Electronic Signature)\par           Reported on: 08/10/21 18:38 EDT, [address], [place],\[place]           [place] [address]\par           Phone: [phone]   Fax: [phone]\par           Cytology technical processing performed at [facility],\par           [address]rd Floor, [place] [address]\par           _________________________________________________________________\par \par  [de-identified] : 8/4/21 reviewed by jai aguayo from 2020

## 2021-09-30 NOTE — HISTORY OF PRESENT ILLNESS
[FreeTextEntry1] : Patient following up on thyroid nodule. Patient had FNA done with Dr. Nassar. Has been coughing at night .  cough is dry .

## 2021-09-30 NOTE — DATA REVIEWED
[de-identified] : PASQUALE RICCI                          1\par \par \par \par                  Cytopathology Report\par \par \par \par \par \par           Final Diagnosis\par           THYROID NODULE, RIGHT MID, US-GUIDED FNA:\par           - NO MALIGNANT CELLS IDENTIFIED.\par           - Histiocytes and few benign follicular cells, consistent with\par           colloid nodule with cystic degeneration ([place] category II).\par           - Cell block examined.\par \par           Screened by: Nella ACEVEDO(ASCP)\par           Verified by: Carl Rodrigues M.D.\par           (Electronic Signature)\par           Reported on: 08/10/21 18:38 EDT, [address], [place],\[place]           [place] [address]\par           Phone: [phone]   Fax: [phone]\par           Cytology technical processing performed at [facility],\par           [address]rd Floor, [place] [address]\par           _________________________________________________________________\par \par  [de-identified] : 8/4/21 reviewed by jai aguayo from 2020

## 2021-10-01 NOTE — ASU PATIENT PROFILE, ADULT - ABILITY TO HEAR (WITH HEARING AID OR HEARING APPLIANCE IF NORMALLY USED):
some samples at our [facility] for [facility] and take 1 tablet every other day. Make an appointment to see me in 2 weeks. This is on migraine prophylaxis medication and I want to see how he responds to it. Adequate: hears normal conversation without difficulty

## 2021-10-01 NOTE — REASON FOR VISIT
[Subsequent Evaluation] : a subsequent evaluation for [FreeTextEntry2] : please see other note on 9/30 for billing and documentation

## 2021-10-28 ENCOUNTER — OUTPATIENT (OUTPATIENT)
Dept: OUTPATIENT SERVICES | Facility: HOSPITAL | Age: 66
LOS: 1 days | Discharge: HOME | End: 2021-10-28

## 2021-10-28 ENCOUNTER — APPOINTMENT (OUTPATIENT)
Dept: INTERNAL MEDICINE | Facility: CLINIC | Age: 66
End: 2021-10-28
Payer: MEDICAID

## 2021-10-28 VITALS
DIASTOLIC BLOOD PRESSURE: 84 MMHG | HEIGHT: 63 IN | BODY MASS INDEX: 30.83 KG/M2 | OXYGEN SATURATION: 100 % | TEMPERATURE: 98 F | WEIGHT: 174 LBS | SYSTOLIC BLOOD PRESSURE: 123 MMHG | HEART RATE: 99 BPM

## 2021-10-28 DIAGNOSIS — Z90.49 ACQUIRED ABSENCE OF OTHER SPECIFIED PARTS OF DIGESTIVE TRACT: Chronic | ICD-10-CM

## 2021-10-28 DIAGNOSIS — Z90.710 ACQUIRED ABSENCE OF BOTH CERVIX AND UTERUS: Chronic | ICD-10-CM

## 2021-10-28 DIAGNOSIS — Z98.890 OTHER SPECIFIED POSTPROCEDURAL STATES: Chronic | ICD-10-CM

## 2021-10-28 PROCEDURE — 99214 OFFICE O/P EST MOD 30 MIN: CPT | Mod: GC

## 2021-10-28 RX ORDER — ATORVASTATIN CALCIUM 80 MG/1
80 TABLET, FILM COATED ORAL
Qty: 30 | Refills: 5 | Status: DISCONTINUED | COMMUNITY
Start: 2021-04-22 | End: 2021-10-28

## 2021-10-28 NOTE — ASSESSMENT
[FreeTextEntry1] : 64-year-old, Female, with hx HTN, HLD, DM II, and SLE with proteinuria presents to clinic for follow up. \par \par #Diabetes Mellitus\par - Last HbA1c: 10.4% from  2020. Was recommended to repeat hbA1c and never did. \par - started on ozempic and pioglitazone. \par - repeat HbA1c now \par - f/u Endocrine Dr. Duncan, latest recommendations from Endocrinology done on 2021: \par a. continue metformin 1000mg po q12hrs, pioglitazone 30mg po once daily, steglatro\par b. The patient was instructed to stop Steglatro and start Farixga 10mg po once daily which she never did ( and said that she will todaq)\par c. Increase ozempic to 1 mg from 0.5 mg\par - maintain log book\par - Podiatry referral given, never saw them. \par - Opthalmology referral for diabetic eye exam. However the patient reports that she said a private ophthalmologist on [facility] of which she forgot the name. \par - Repeat hbA1c and urine microalbumin\par \par #Thyroid nodule in 2020\par - 2.2 cm - we ordered TSH and endocrinology referral as pt needs US guided biopsy \par - US shows TRADS 4- will need repeat US now and 2,3,5 years. \par - FNA performed in 2021: No malignant cells\par \par #Dyslipidemia\par - T LDL: 220 HDL: 52 from 2020\par - ASCVD: 16.8% \par - Repeat lipid profile \par - Continue Atorvastatin 80mg po once daily\par \par #Heart burn\par - continue omeprazole \par - persistent symptoms > 5 years \par - had EGD and colono - no abnormal findings \par \par #Hepatic steatosis\par - AST 66, \par - No repeat LFTs were never done\par - Wait for repeat liver enzymes\par - If elevated needs CLD work up, and stop statin if > 3UNL\par \par #Hypertension\par - BP today: 123/84mmHg\par - continue benazepril, Spironolactone \par \par #Pulmonary nodule \par - CT chest Dec 2018: Right upper 2 mm calcified nodule, likely represents a granuloma.\par - former smoker (0.5 pack per day), smoked for 39 years. Quit in . \par - Repeat CT scan and decide accordingly. \par - The patient denies any weight loss, No hemoptysis. No fevers, no weight sweats. \par - Denies any family history of lung cancer. \par \par #SLE with proteinuria \par - on Hydroxychloroquine \par - pt is on benazepril and spironolactone (prescribed by Nephrologist)\par - pt is following Rheumatology ( Dr. Shepard). \par \par #Ex-smoker: \par - Quit in \par - Smoked for 39 years, 1/2 pack. 19 pack years. \par -  Patient has a pulmonary nodule, will do CT scan regular ( not the low dose)\par \par HCM: \par - Mammogram  birads 4- biopsy was benign ( sterotactic biopsy was done)\par - Repeat mammogram referral will be given\par - Pap smear: patient reports that she had total hysterectomy including cervix in . \par - Last colonoscopy  normal, reepeat recommended in 10 years (). \par - EGD: hiatal hernia\par - The patient has a lot of refills from all active medications, she does not need any refills\par \par return 6 months and prn

## 2021-10-28 NOTE — PHYSICAL EXAM
[Normal Sclera/Conjunctiva] : normal sclera/conjunctiva [No JVD] : no jugular venous distention [Supple] : supple [No Respiratory Distress] : no respiratory distress  [No Accessory Muscle Use] : no accessory muscle use [Clear to Auscultation] : lungs were clear to auscultation bilaterally [Normal Rate] : normal rate  [Regular Rhythm] : with a regular rhythm [Normal S1, S2] : normal S1 and S2 [Soft] : abdomen soft [Non Tender] : non-tender [No HSM] : no HSM [Normal] : affect was normal and insight and judgment were intact [de-identified] : Feet are clear, no ulcers or wounds

## 2021-10-28 NOTE — HISTORY OF PRESENT ILLNESS
[FreeTextEntry1] : Follow up [de-identified] : 64-year-old, Female, with hx HTN, HLD, DM II, and SLE with proteinuria presents to clinic for follow up. \par

## 2021-11-05 DIAGNOSIS — R91.1 SOLITARY PULMONARY NODULE: ICD-10-CM

## 2021-11-05 DIAGNOSIS — Z00.00 ENCOUNTER FOR GENERAL ADULT MEDICAL EXAMINATION WITHOUT ABNORMAL FINDINGS: ICD-10-CM

## 2021-11-05 DIAGNOSIS — E78.5 HYPERLIPIDEMIA, UNSPECIFIED: ICD-10-CM

## 2021-11-05 DIAGNOSIS — I10 ESSENTIAL (PRIMARY) HYPERTENSION: ICD-10-CM

## 2021-11-05 DIAGNOSIS — R74.8 ABNORMAL LEVELS OF OTHER SERUM ENZYMES: ICD-10-CM

## 2021-11-05 DIAGNOSIS — E11.9 TYPE 2 DIABETES MELLITUS WITHOUT COMPLICATIONS: ICD-10-CM

## 2021-11-27 ENCOUNTER — RESULT REVIEW (OUTPATIENT)
Age: 66
End: 2021-11-27

## 2021-11-27 ENCOUNTER — OUTPATIENT (OUTPATIENT)
Dept: OUTPATIENT SERVICES | Facility: HOSPITAL | Age: 66
LOS: 1 days | Discharge: HOME | End: 2021-11-27
Payer: MEDICAID

## 2021-11-27 DIAGNOSIS — Z90.710 ACQUIRED ABSENCE OF BOTH CERVIX AND UTERUS: Chronic | ICD-10-CM

## 2021-11-27 DIAGNOSIS — Z90.49 ACQUIRED ABSENCE OF OTHER SPECIFIED PARTS OF DIGESTIVE TRACT: Chronic | ICD-10-CM

## 2021-11-27 DIAGNOSIS — Z12.31 ENCOUNTER FOR SCREENING MAMMOGRAM FOR MALIGNANT NEOPLASM OF BREAST: ICD-10-CM

## 2021-11-27 DIAGNOSIS — Z98.890 OTHER SPECIFIED POSTPROCEDURAL STATES: Chronic | ICD-10-CM

## 2021-11-27 PROCEDURE — 77067 SCR MAMMO BI INCL CAD: CPT | Mod: 26

## 2021-11-27 PROCEDURE — 77063 BREAST TOMOSYNTHESIS BI: CPT | Mod: 26

## 2021-12-01 ENCOUNTER — LABORATORY RESULT (OUTPATIENT)
Age: 66
End: 2021-12-01

## 2021-12-15 ENCOUNTER — RESULT REVIEW (OUTPATIENT)
Age: 66
End: 2021-12-15

## 2021-12-15 ENCOUNTER — OUTPATIENT (OUTPATIENT)
Dept: OUTPATIENT SERVICES | Facility: HOSPITAL | Age: 66
LOS: 1 days | Discharge: HOME | End: 2021-12-15
Payer: MEDICAID

## 2021-12-15 DIAGNOSIS — Z98.890 OTHER SPECIFIED POSTPROCEDURAL STATES: Chronic | ICD-10-CM

## 2021-12-15 DIAGNOSIS — R91.1 SOLITARY PULMONARY NODULE: ICD-10-CM

## 2021-12-15 DIAGNOSIS — Z90.49 ACQUIRED ABSENCE OF OTHER SPECIFIED PARTS OF DIGESTIVE TRACT: Chronic | ICD-10-CM

## 2021-12-15 DIAGNOSIS — Z90.710 ACQUIRED ABSENCE OF BOTH CERVIX AND UTERUS: Chronic | ICD-10-CM

## 2021-12-15 PROCEDURE — 71260 CT THORAX DX C+: CPT | Mod: 26

## 2021-12-21 ENCOUNTER — APPOINTMENT (OUTPATIENT)
Dept: OPHTHALMOLOGY | Facility: CLINIC | Age: 66
End: 2021-12-21

## 2021-12-29 ENCOUNTER — OUTPATIENT (OUTPATIENT)
Dept: OUTPATIENT SERVICES | Facility: HOSPITAL | Age: 66
LOS: 1 days | Discharge: HOME | End: 2021-12-29

## 2021-12-29 ENCOUNTER — APPOINTMENT (OUTPATIENT)
Dept: ENDOCRINOLOGY | Facility: CLINIC | Age: 66
End: 2021-12-29

## 2021-12-29 VITALS
WEIGHT: 171 LBS | HEIGHT: 63 IN | HEART RATE: 87 BPM | OXYGEN SATURATION: 97 % | BODY MASS INDEX: 30.3 KG/M2 | SYSTOLIC BLOOD PRESSURE: 143 MMHG | DIASTOLIC BLOOD PRESSURE: 76 MMHG | TEMPERATURE: 98.7 F

## 2021-12-29 DIAGNOSIS — Z98.890 OTHER SPECIFIED POSTPROCEDURAL STATES: Chronic | ICD-10-CM

## 2021-12-29 DIAGNOSIS — Z90.49 ACQUIRED ABSENCE OF OTHER SPECIFIED PARTS OF DIGESTIVE TRACT: Chronic | ICD-10-CM

## 2021-12-29 DIAGNOSIS — Z90.710 ACQUIRED ABSENCE OF BOTH CERVIX AND UTERUS: Chronic | ICD-10-CM

## 2021-12-29 RX ORDER — INSULIN ADMIN. SUPPLIES
30G X 8 MM INSULIN PEN (EA) SUBCUTANEOUS
Qty: 1 | Refills: 5 | Status: DISCONTINUED | COMMUNITY
Start: 2017-03-01 | End: 2021-12-29

## 2021-12-29 RX ORDER — ERTUGLIFLOZIN 15 MG/1
15 TABLET, FILM COATED ORAL
Qty: 30 | Refills: 5 | Status: DISCONTINUED | COMMUNITY
Start: 2021-01-20 | End: 2021-12-29

## 2021-12-29 RX ORDER — PEN NEEDLE, DIABETIC 29 G X1/2"
31G X 5 MM NEEDLE, DISPOSABLE MISCELLANEOUS
Qty: 100 | Refills: 2 | Status: DISCONTINUED | COMMUNITY
Start: 2018-05-31 | End: 2021-12-29

## 2021-12-29 RX ORDER — SEMAGLUTIDE 1.34 MG/ML
2 INJECTION, SOLUTION SUBCUTANEOUS
Qty: 1.5 | Refills: 5 | Status: DISCONTINUED | COMMUNITY
Start: 2020-11-11 | End: 2021-12-29

## 2021-12-29 NOTE — ASSESSMENT
[Diabetes Foot Care] : diabetes foot care [Long Term Vascular Complications] : long term vascular complications of diabetes [Carbohydrate Consistent Diet] : carbohydrate consistent diet [Importance of Diet and Exercise] : importance of diet and exercise to improve glycemic control, achieve weight loss and improve cardiovascular health [Retinopathy Screening] : Patient was referred to ophthalmology for retinopathy screening [FreeTextEntry1] : needs weight loss, try xigduo, follow up with primary care only

## 2022-01-05 ENCOUNTER — APPOINTMENT (OUTPATIENT)
Dept: PODIATRY | Facility: CLINIC | Age: 67
End: 2022-01-05

## 2022-01-21 ENCOUNTER — NON-APPOINTMENT (OUTPATIENT)
Age: 67
End: 2022-01-21

## 2022-01-21 ENCOUNTER — APPOINTMENT (OUTPATIENT)
Dept: PULMONOLOGY | Facility: CLINIC | Age: 67
End: 2022-01-21
Payer: MEDICAID

## 2022-01-21 ENCOUNTER — OUTPATIENT (OUTPATIENT)
Dept: OUTPATIENT SERVICES | Facility: HOSPITAL | Age: 67
LOS: 1 days | Discharge: HOME | End: 2022-01-21

## 2022-01-21 ENCOUNTER — RX RENEWAL (OUTPATIENT)
Age: 67
End: 2022-01-21

## 2022-01-21 VITALS
HEART RATE: 90 BPM | OXYGEN SATURATION: 98 % | TEMPERATURE: 97.4 F | HEIGHT: 63 IN | WEIGHT: 171 LBS | BODY MASS INDEX: 30.3 KG/M2 | DIASTOLIC BLOOD PRESSURE: 80 MMHG | SYSTOLIC BLOOD PRESSURE: 125 MMHG

## 2022-01-21 DIAGNOSIS — Z90.49 ACQUIRED ABSENCE OF OTHER SPECIFIED PARTS OF DIGESTIVE TRACT: Chronic | ICD-10-CM

## 2022-01-21 DIAGNOSIS — R06.02 SHORTNESS OF BREATH: ICD-10-CM

## 2022-01-21 DIAGNOSIS — R91.1 SOLITARY PULMONARY NODULE: ICD-10-CM

## 2022-01-21 DIAGNOSIS — Z90.710 ACQUIRED ABSENCE OF BOTH CERVIX AND UTERUS: Chronic | ICD-10-CM

## 2022-01-21 DIAGNOSIS — Z98.890 OTHER SPECIFIED POSTPROCEDURAL STATES: Chronic | ICD-10-CM

## 2022-01-21 PROCEDURE — 99213 OFFICE O/P EST LOW 20 MIN: CPT | Mod: GC

## 2022-01-21 NOTE — ASSESSMENT
[FreeTextEntry1] : # pulmonary nodule\par - CT chest Dec 2018: Right upper 2 mm calcified nodule, likely represents a granuloma.\par - CT chest Dec 2021: no pulmonary nodules\par - low dose CT scan in coming December\par - follow up in 6 months with repeat PFTs\par \par # Former smoker\par - repeat PFTs\par

## 2022-01-21 NOTE — END OF VISIT
[] : Fellow [FreeTextEntry3] : patient seen and examined it is duplicated note \par PFT ordered \par screening ct scan in dec 2022\par patient did follo follow with pmd for thyroid nodule s/p US \par counseled for weight loss and exercise

## 2022-01-21 NOTE — HISTORY OF PRESENT ILLNESS
[Former] : former [Never] : never [TextBox_4] : 66-year-old, Female with PMhx of ex smoker, pulmonary nodule, HTN, HLD, DM II, and SLE with proteinuria comes in for follow up for pulmonary nodule. \par \par Denies any active issues like cough, hemoptysis, weight loss, SOB or leg swelling. [TextBox_11] : 1 [TextBox_13] : 51 [YearQuit] : 2013 [Awakes Unrefreshed] : does not awaken unrefreshed

## 2022-05-10 ENCOUNTER — OUTPATIENT (OUTPATIENT)
Dept: OUTPATIENT SERVICES | Facility: HOSPITAL | Age: 67
LOS: 1 days | Discharge: HOME | End: 2022-05-10

## 2022-05-10 ENCOUNTER — NON-APPOINTMENT (OUTPATIENT)
Age: 67
End: 2022-05-10

## 2022-05-10 ENCOUNTER — APPOINTMENT (OUTPATIENT)
Dept: INTERNAL MEDICINE | Facility: CLINIC | Age: 67
End: 2022-05-10
Payer: MEDICAID

## 2022-05-10 VITALS
WEIGHT: 166 LBS | SYSTOLIC BLOOD PRESSURE: 136 MMHG | BODY MASS INDEX: 29.41 KG/M2 | HEART RATE: 104 BPM | TEMPERATURE: 97 F | OXYGEN SATURATION: 100 % | HEIGHT: 63 IN | DIASTOLIC BLOOD PRESSURE: 81 MMHG

## 2022-05-10 DIAGNOSIS — Z90.710 ACQUIRED ABSENCE OF BOTH CERVIX AND UTERUS: Chronic | ICD-10-CM

## 2022-05-10 DIAGNOSIS — Z90.49 ACQUIRED ABSENCE OF OTHER SPECIFIED PARTS OF DIGESTIVE TRACT: Chronic | ICD-10-CM

## 2022-05-10 DIAGNOSIS — Z98.890 OTHER SPECIFIED POSTPROCEDURAL STATES: Chronic | ICD-10-CM

## 2022-05-10 PROCEDURE — 99213 OFFICE O/P EST LOW 20 MIN: CPT | Mod: GC

## 2022-05-10 NOTE — PHYSICAL EXAM
[Normal] : normal gait, coordination grossly intact, no focal deficits and deep tendon reflexes were 2+ and symmetric [de-identified] : right shoulder and elbow tenderness

## 2022-05-10 NOTE — HISTORY OF PRESENT ILLNESS
[FreeTextEntry1] : fu [de-identified] : 64-year-old, Female, with hx HTN, HLD, DM II, and SLE with proteinuria presents to clinic for follow up. Patient reports right shoulder and elbow pain. she also had a fall 1 month ago on her back. she is compliant with her meds. no other symptoms. she saw lung doctor and lung nodule was not seen on new CT chest. she follows up with Endo who recommended stopping ozempic and starting xigduo but patient kept ozempic and started metformin. no other complaints. he rheumatologist and nephrologist are outside

## 2022-05-10 NOTE — ASSESSMENT
[FreeTextEntry1] : 64-year-old, Female, with hx HTN, HLD, DM II, and SLE with proteinuria presents to clinic for follow up. \par \par #Diabetes Mellitus\par - Last HbA1c in December: improved to 7.5 from 10.4\par - was told by endo to stop ozempic and start xigduo but she continued ozempic and started metformin only. and she wants to continue this regimen\par - continue poiglitazone\par - repeat HbA1c before next visit\par - f/u Endocrine Dr. Laboy\par - Podiatry referral\par - Opthalmology referral\par - elevated urine alb/Cr ratio with normal kidney function. follows with nephro as outpatient\par \par #Thyroid nodule in 2020\par - last US nodule increased to 1.8 cm from 1.5\par - repeat thyroid US\par - Endo fu\par - FNA performed in 2021: No malignant cells\par \par #Dyslipidemia\par - T LDL: 220 HDL: 52 from 2020\par - ASCVD: 16.8% \par - Repeat lipid profile improving\par - Continue Atorvastatin 80mg po once daily. can decrease dose next visit since lipid panel within normal limits now\par \par #Heart burn\par - continue omeprazole \par - persistent symptoms > 5 years \par - had EGD and colono - no abnormal findings \par \par #Hepatic steatosis\par - LFTs mild elevation ALT 71\par - repeat LFTs and if increasing consider stopping statin or decreasing dose\par \par #Hypertension\par - BP today: 130/80\par - continue benazepril, Spironolactone \par \par #Pulmonary nodule \par - CT chest Dec 2018: Right upper 2 mm calcified nodule, likely represents a granuloma.\par - former smoker (0.5 pack per day), smoked for 39 years. Quit in . \par - Repeat CT scan showed no nodule\par - per pulm send for PFTs\par \par #SLE with proteinuria \par - on Hydroxychloroquine \par - pt is on benazepril and spironolactone (prescribed by Nephrologist)\par - pt is following Rheumatology ( Dr. Shepard). wants inpatient input --> referral given for our rheumatologist\par \par \par HCM: \par - Mammogram 2020 birads 4- biopsy was benign ( sterotactic biopsy was done)\par - Repeat mammogram within normal limits. needs annual mammography\par - Pap smear: patient reports that she had total hysterectomy including cervix in . \par - Last colonoscopy 2020 normal, repeat recommended in 10 years (). \par - EGD: hiatal hernia\par \par \par return 6 months and prn. \par

## 2022-05-12 DIAGNOSIS — R91.1 SOLITARY PULMONARY NODULE: ICD-10-CM

## 2022-05-12 DIAGNOSIS — M32.9 SYSTEMIC LUPUS ERYTHEMATOSUS, UNSPECIFIED: ICD-10-CM

## 2022-05-12 DIAGNOSIS — E11.9 TYPE 2 DIABETES MELLITUS WITHOUT COMPLICATIONS: ICD-10-CM

## 2022-05-12 DIAGNOSIS — E04.1 NONTOXIC SINGLE THYROID NODULE: ICD-10-CM

## 2022-05-12 DIAGNOSIS — Z00.00 ENCOUNTER FOR GENERAL ADULT MEDICAL EXAMINATION WITHOUT ABNORMAL FINDINGS: ICD-10-CM

## 2022-06-18 ENCOUNTER — RX RENEWAL (OUTPATIENT)
Age: 67
End: 2022-06-18

## 2022-09-02 ENCOUNTER — APPOINTMENT (OUTPATIENT)
Dept: RHEUMATOLOGY | Facility: CLINIC | Age: 67
End: 2022-09-02

## 2022-09-02 ENCOUNTER — OUTPATIENT (OUTPATIENT)
Dept: OUTPATIENT SERVICES | Facility: HOSPITAL | Age: 67
LOS: 1 days | Discharge: HOME | End: 2022-09-02

## 2022-09-02 VITALS
HEART RATE: 94 BPM | WEIGHT: 166 LBS | DIASTOLIC BLOOD PRESSURE: 82 MMHG | TEMPERATURE: 96.4 F | HEIGHT: 63 IN | BODY MASS INDEX: 29.41 KG/M2 | SYSTOLIC BLOOD PRESSURE: 120 MMHG | OXYGEN SATURATION: 99 %

## 2022-09-02 DIAGNOSIS — Z90.49 ACQUIRED ABSENCE OF OTHER SPECIFIED PARTS OF DIGESTIVE TRACT: Chronic | ICD-10-CM

## 2022-09-02 DIAGNOSIS — M32.9 SYSTEMIC LUPUS ERYTHEMATOSUS, UNSPECIFIED: ICD-10-CM

## 2022-09-02 DIAGNOSIS — Z90.710 ACQUIRED ABSENCE OF BOTH CERVIX AND UTERUS: Chronic | ICD-10-CM

## 2022-09-02 DIAGNOSIS — Z98.890 OTHER SPECIFIED POSTPROCEDURAL STATES: Chronic | ICD-10-CM

## 2022-09-02 DIAGNOSIS — Z79.899 OTHER LONG TERM (CURRENT) DRUG THERAPY: ICD-10-CM

## 2022-09-02 PROCEDURE — 99204 OFFICE O/P NEW MOD 45 MIN: CPT | Mod: GC

## 2022-09-02 NOTE — PHYSICAL EXAM
[General Appearance - Alert] : alert [General Appearance - In No Acute Distress] : in no acute distress [General Appearance - Well Nourished] : well nourished [General Appearance - Well-Appearing] : healthy appearing [Sclera] : the sclera and conjunctiva were normal [PERRL With Normal Accommodation] : pupils were equal in size, round, and reactive to light [Extraocular Movements] : extraocular movements were intact [Examination Of The Oral Cavity] : the lips and gums were normal [Oropharynx] : the oropharynx was normal [Neck Appearance] : the appearance of the neck was normal [Jugular Venous Distention Increased] : there was no jugular-venous distention [Respiration, Rhythm And Depth] : normal respiratory rhythm and effort [Exaggerated Use Of Accessory Muscles For Inspiration] : no accessory muscle use [Auscultation Breath Sounds / Voice Sounds] : lungs were clear to auscultation bilaterally [Heart Rate And Rhythm] : heart rate was normal and rhythm regular [Heart Sounds] : normal S1 and S2 [Murmurs] : no murmurs [Edema] : there was no peripheral edema [Bowel Sounds] : normal bowel sounds [Abdomen Soft] : soft [Abdomen Tenderness] : non-tender [Abdomen Mass (___ Cm)] : no abdominal mass palpated [Nail Clubbing] : no clubbing  or cyanosis of the fingernails [Skin Color & Pigmentation] : normal skin color and pigmentation [] : no rash [Skin Lesions] : no skin lesions

## 2022-09-02 NOTE — END OF VISIT
[] : Resident [FreeTextEntry3] : 67 year old female here to establish care for SLE. She was diagnosed with SLE in 1997 had 2 kidney biopsies for proteinuria both negative. Follows with nephrology for this. She's been on  mg BID and has been off for the past 2 years since the COVID pandemic. She reports bilateral elbow pain, swelling and stiffness, knee pain and finger pains at nighttime. Otherwise feels well without acute complaints.\par -Re-start  mg BID needs annual eye exam\par -Check baseline SLE labs\par -F/u 3 months  [Time Spent: ___ minutes] : I have spent [unfilled] minutes of time on the encounter.

## 2022-09-02 NOTE — HISTORY OF PRESENT ILLNESS
[Arthritis] : arthritis [Arthralgias] : arthralgias [Sun Sensitivity] : sun sensitivity [FreeTextEntry1] : 67 year old female with lupus, DM, HTN, HLD presents to rheum clinic for establishment of care.\par Dx with lupus ~1997, followed with Dr. Shepard up until 2020 and was lost to follow up d/t the pandemic. Was treated with hydroxychloroquine (?200mg daily?) until 2 years ago she stopped taking it. \par Pt has noticed increased joint swelling/pain of the b/l elbows and knees and night time pain/swelling of the fingers b/l since stopping the medication.\par Pt endorses proteniuria which has been known and followed by Dr. Sherman her nephrologist since 1997. \par She has had two kidney biopsies last in 2007. Per pt, they have been normal does not show signs of lupus nephritis. She takes spironolactone 100mg bid for her proteinura per pt.\par  [Headache] : no headaches [Rash] : no rash [Chest Pain] : no chest pain [Shortness of Breath] : no shortness of breath [Abdominal Pain] : no abdominal pain [Fatigue] : no fatigue [FreeTextEntry3] : 1997

## 2022-09-02 NOTE — REVIEW OF SYSTEMS
[Arthralgias] : arthralgias [Joint Pain] : joint pain [Joint Swelling] : joint swelling [Negative] : Neurological [FreeTextEntry9] : elbows b/l, knees b/l, fingers tenderness b/l

## 2022-09-02 NOTE — ASSESSMENT
[FreeTextEntry1] : 67 year old female with lupus, DM, HTN, HLD presents to rheum clinic for establishment of care.\par Dx with lupus ~1997, followed with Dr. Shepard up until 2020 and was lost to follow up d/t the pandemic. Was treated with hydroxychloroquine (?200mg bid) until 2 years ago she stopped taking it. \par Pt has noticed increased joint swelling/pain of the b/l elbows and knees and night time pain/swelling of the fingers b/l since stopping the medication. She denies oral ulcers, dry eyes or dry mouth, but has sun sensitivity\par Pt endorses proteniuria which has been known and followed by Dr. Sherman her nephrologist since [id number]. \par She has had two kidney biopsies last in 2007. Per pt, they have been normal does not show signs of lupus nephritis. She takes spironolactone 100mg bid for her proteinura per pt.\par \par #SLE\par -obtain baseline SLE labs \par -advised yearly eyecheck; discussed effects of hydroxychloroquine and side efx affecting eyes\par -start hydroxcholorquine 200mg Twice daily\par -f/u in 3 months\par

## 2022-09-29 ENCOUNTER — RX RENEWAL (OUTPATIENT)
Age: 67
End: 2022-09-29

## 2022-09-29 RX ORDER — BLOOD-GLUCOSE METER
EACH MISCELLANEOUS
Qty: 1 | Refills: 0 | Status: ACTIVE | COMMUNITY
Start: 2022-01-21 | End: 1900-01-01

## 2022-10-31 ENCOUNTER — LABORATORY RESULT (OUTPATIENT)
Age: 67
End: 2022-10-31

## 2022-11-01 LAB
C3 SERPL-MCNC: 173 MG/DL
C4 SERPL-MCNC: 38 MG/DL
CRP SERPL-MCNC: <3 MG/L
ERYTHROCYTE [SEDIMENTATION RATE] IN BLOOD BY WESTERGREN METHOD: 70 MM/HR
RHEUMATOID FACT SER QL: 11 IU/ML

## 2022-11-02 LAB
ANA SER IF-ACNC: NEGATIVE
B2 GLYCOPROT1 IGA SERPL IA-ACNC: <5 SAU
B2 GLYCOPROT1 IGM SER-ACNC: 6.1 SMU
CARDIOLIPIN AB SER IA-ACNC: POSITIVE
CARDIOLIPIN IGM SER-MCNC: 7 GPL
CARDIOLIPIN IGM SER-MCNC: <5 MPL
CCP AB SER IA-ACNC: <8 UNITS
DEPRECATED CARDIOLIPIN IGA SER: 11.8 APL
DSDNA AB SER-ACNC: <12 IU/ML
ENA RNP AB SER IA-ACNC: <0.2 AL
ENA SCL70 IGG SER IA-ACNC: <0.2 AL
ENA SM AB SER IA-ACNC: <0.2 AL
ENA SS-A AB SER IA-ACNC: 6.1 AL
ENA SS-B AB SER IA-ACNC: <0.2 AL
RF+CCP IGG SER-IMP: NEGATIVE

## 2022-11-07 LAB — B2 GLYCOPROT1 IGG SER-ACNC: <5 SGU

## 2022-11-08 ENCOUNTER — OUTPATIENT (OUTPATIENT)
Dept: OUTPATIENT SERVICES | Facility: HOSPITAL | Age: 67
LOS: 1 days | Discharge: HOME | End: 2022-11-08

## 2022-11-08 ENCOUNTER — APPOINTMENT (OUTPATIENT)
Dept: INTERNAL MEDICINE | Facility: CLINIC | Age: 67
End: 2022-11-08

## 2022-11-08 VITALS
HEART RATE: 90 BPM | OXYGEN SATURATION: 98 % | DIASTOLIC BLOOD PRESSURE: 81 MMHG | BODY MASS INDEX: 29.77 KG/M2 | WEIGHT: 168 LBS | HEIGHT: 63 IN | TEMPERATURE: 98.3 F | SYSTOLIC BLOOD PRESSURE: 135 MMHG

## 2022-11-08 DIAGNOSIS — Z90.49 ACQUIRED ABSENCE OF OTHER SPECIFIED PARTS OF DIGESTIVE TRACT: Chronic | ICD-10-CM

## 2022-11-08 DIAGNOSIS — Z98.890 OTHER SPECIFIED POSTPROCEDURAL STATES: Chronic | ICD-10-CM

## 2022-11-08 DIAGNOSIS — Z90.710 ACQUIRED ABSENCE OF BOTH CERVIX AND UTERUS: Chronic | ICD-10-CM

## 2022-11-08 PROCEDURE — 99214 OFFICE O/P EST MOD 30 MIN: CPT | Mod: GC

## 2022-11-08 NOTE — PHYSICAL EXAM
[No Acute Distress] : no acute distress [Well Nourished] : well nourished [Well Developed] : well developed [Normal Sclera/Conjunctiva] : normal sclera/conjunctiva [EOMI] : extraocular movements intact [Normal Outer Ear/Nose] : the outer ears and nose were normal in appearance [No Lymphadenopathy] : no lymphadenopathy [Supple] : supple [No Respiratory Distress] : no respiratory distress  [No Accessory Muscle Use] : no accessory muscle use [Clear to Auscultation] : lungs were clear to auscultation bilaterally [Normal Rate] : normal rate  [Regular Rhythm] : with a regular rhythm [Normal S1, S2] : normal S1 and S2 [No Abdominal Bruit] : a ~M bruit was not heard ~T in the abdomen [No Varicosities] : no varicosities [No Edema] : there was no peripheral edema [No Palpable Aorta] : no palpable aorta [No Extremity Clubbing/Cyanosis] : no extremity clubbing/cyanosis [Soft] : abdomen soft [Non Tender] : non-tender [Non-distended] : non-distended [Normal Bowel Sounds] : normal bowel sounds [Normal Supraclavicular Nodes] : no supraclavicular lymphadenopathy [Normal Posterior Cervical Nodes] : no posterior cervical lymphadenopathy [Normal Anterior Cervical Nodes] : no anterior cervical lymphadenopathy [No Rash] : no rash [No Skin Lesions] : no skin lesions [Coordination Grossly Intact] : coordination grossly intact [No Focal Deficits] : no focal deficits [Speech Grossly Normal] : speech grossly normal [Normal Affect] : the affect was normal [Normal Mood] : the mood was normal [Normal Insight/Judgement] : insight and judgment were intact

## 2022-11-08 NOTE — ASSESSMENT
[FreeTextEntry1] : 67-year-old, Female, with hx HTN, HLD, DM II, and SLE with proteinuria presents to clinic for follow up. \par \par #Diabetes Mellitus\par - Last HbA1c in December 2021; new HbA1c ordered\par - continue Ozempic, Metformin, poiglitazone\par - f/u Endocrine Dr. Laboy\par - Follow up with nephro as outpatient in 2 weeks\par \par #Thyroid nodule in Jan 2020\par - last US nodule increased to 1.8 cm from 1.5\par - Endo f/u\par - FNA performed in August 2021: No malignant cells\par \par #Dyslipidemia\par - repeat lipid panel ordered\par - Continue Atorvastatin 80mg po once daily\par \par #Heart burn\par - continue omeprazole \par - persistent symptoms > 5 years \par - had EGD and colono 2020- no abnormal findings \par \par #Hepatic steatosis\par - CMP Ordered\par - repeat LFTs and if increasing consider stopping statin or decreasing dose\par \par #Hypertension\par - BP today: 135/81\par - continue benazepril, Spironolactone \par \par #Pulmonary nodule \par - CT chest Dec 2018: Right upper 2 mm calcified nodule, likely represents a granuloma.\par - former smoker (0.5 pack per day), smoked for 39 years. Quit in 2013. \par - Repeat CT scan showed no nodule\par - Follow up with Pulm and PFTs\par \par #SLE with proteinuria \par - on Hydroxychloroquine \par - pt is on benazepril and spironolactone (prescribed by Nephrologist)\par - pt is following Rheumatology ( Dr. Shepard)\par - Patient reminded to have yearly eye exams, referral for opthomalogy given\par \par HCM: \par - Mammogram 2020 birads 4- biopsy was benign ( sterotactic biopsy was done)\par - Repeat mammogram within normal limits. needs annual mammography\par - Pap smear: patient reports that she had total hysterectomy including cervix in 2006. \par - Last colonoscopy 2020 normal, repeat recommended in 10 years (2030). \par - EGD: hiatal hernia\par - Flu vaccine given Nov 2022\par \par \par Return in 6 months and prn. \par

## 2022-11-08 NOTE — HISTORY OF PRESENT ILLNESS
[FreeTextEntry1] : Follow up [de-identified] : 67-year-old, Female, with hx HTN, HLD, DM II, and SLE with proteinuria presents to clinic for follow up.\par \par Patient reported that she had seen Rheumatology but refill for Hydroxychloroquine was not given. \par \par Patient reports no acute concerns besides joint pain at baseline. Patient also requested flu vaccine on this visit.

## 2022-11-08 NOTE — REVIEW OF SYSTEMS
[Joint Pain] : joint pain [Back Pain] : back pain [Easy Bruising] : easy bruising [Fever] : no fever [Chills] : no chills [Fatigue] : no fatigue [Pain] : no pain [Vision Problems] : no vision problems [Earache] : no earache [Hearing Loss] : no hearing loss [Sore Throat] : no sore throat [Chest Pain] : no chest pain [Leg Claudication] : no leg claudication [Lower Ext Edema] : no lower extremity edema [Shortness Of Breath] : no shortness of breath [Abdominal Pain] : no abdominal pain [Nausea] : no nausea [Constipation] : no constipation [Diarrhea] : diarrhea [Vomiting] : no vomiting [Dysuria] : no dysuria [Hematuria] : no hematuria [Frequency] : no frequency [Itching] : no itching [Skin Rash] : no skin rash [Headache] : no headache [Dizziness] : no dizziness [Anxiety] : no anxiety [Depression] : no depression [Easy Bleeding] : no easy bleeding

## 2022-11-10 DIAGNOSIS — I10 ESSENTIAL (PRIMARY) HYPERTENSION: ICD-10-CM

## 2022-11-10 DIAGNOSIS — E11.9 TYPE 2 DIABETES MELLITUS WITHOUT COMPLICATIONS: ICD-10-CM

## 2022-11-10 DIAGNOSIS — E78.5 HYPERLIPIDEMIA, UNSPECIFIED: ICD-10-CM

## 2023-03-06 ENCOUNTER — OUTPATIENT (OUTPATIENT)
Dept: OUTPATIENT SERVICES | Facility: HOSPITAL | Age: 68
LOS: 1 days | End: 2023-03-06
Payer: MEDICARE

## 2023-03-06 ENCOUNTER — APPOINTMENT (OUTPATIENT)
Dept: INTERNAL MEDICINE | Facility: CLINIC | Age: 68
End: 2023-03-06
Payer: MEDICARE

## 2023-03-06 VITALS
HEIGHT: 63 IN | TEMPERATURE: 98.5 F | DIASTOLIC BLOOD PRESSURE: 74 MMHG | SYSTOLIC BLOOD PRESSURE: 118 MMHG | HEART RATE: 84 BPM | BODY MASS INDEX: 29.41 KG/M2 | WEIGHT: 166 LBS | OXYGEN SATURATION: 98 %

## 2023-03-06 DIAGNOSIS — Z98.890 OTHER SPECIFIED POSTPROCEDURAL STATES: Chronic | ICD-10-CM

## 2023-03-06 DIAGNOSIS — Z90.49 ACQUIRED ABSENCE OF OTHER SPECIFIED PARTS OF DIGESTIVE TRACT: Chronic | ICD-10-CM

## 2023-03-06 DIAGNOSIS — E78.5 HYPERLIPIDEMIA, UNSPECIFIED: ICD-10-CM

## 2023-03-06 DIAGNOSIS — Z00.00 ENCOUNTER FOR GENERAL ADULT MEDICAL EXAMINATION W/OUT ABNORMAL FINDINGS: ICD-10-CM

## 2023-03-06 DIAGNOSIS — Z90.710 ACQUIRED ABSENCE OF BOTH CERVIX AND UTERUS: Chronic | ICD-10-CM

## 2023-03-06 DIAGNOSIS — Z00.00 ENCOUNTER FOR GENERAL ADULT MEDICAL EXAMINATION WITHOUT ABNORMAL FINDINGS: ICD-10-CM

## 2023-03-06 PROCEDURE — 99214 OFFICE O/P EST MOD 30 MIN: CPT | Mod: GC

## 2023-03-06 PROCEDURE — 99214 OFFICE O/P EST MOD 30 MIN: CPT

## 2023-03-06 NOTE — ASSESSMENT
[FreeTextEntry1] : 67-year-old, Female, with hx HTN, HLD, DM II, and SLE with proteinuria presents to clinic for follow up. \par \par # Diabetes Mellitus\par - Last HbA1c in December 2021; new HbA1c ordered\par - continue Ozempic, Metformin, poiglitazone\par - f/u Endocrine Dr. Laboy\par \par # Thyroid nodule in Jan 2020\par - last US nodule increased to 1.8 cm from 1.5\par - Endo f/u\par - FNA performed in August 2021: No malignant cells\par \par # Dyslipidemia\par - Continue Atorvastatin 80mg po once daily\par \par # GERD\par - continue omeprazole \par - persistent symptoms > 5 years \par - had EGD and colono 2020- no abnormal findings \par \par # Hepatic steatosis\par \par # Hypertension - Controlled; c/w Benazepril \par \par # Pulmonary nodule \par - CT chest Dec 2018: Right upper 2 mm calcified nodule, likely represents a granuloma.\par - former smoker (0.5 pack per day), smoked for 39 years. Quit in 2013. \par - Repeat CT scan showed no nodule\par - Follow up with Pulm and PFTs\par \par # SLE with proteinuria \par - On Hydroxychloroquine \par - pt is on benazepril and spironolactone (prescribed by Nephrologist)\par - pt is following Rheumatology ( Dr. Shepard)\par - Patient reminded to have yearly eye exams, referral for opthomalogy given\par \par \par HCM: \par - Repeat Mammogram ordered; \par - Routine labs ordered; \par - Pap smear: patient reports that she had total hysterectomy including cervix in 2006. \par - Last Colonoscopy 2020 normal, repeat recommended in 10 years (2030). \par - Flu vaccine given Nov 2022\par - Patient did not need any refills this visit; \par \par \par Return in 6 months and prn.

## 2023-03-06 NOTE — PHYSICAL EXAM
[No Acute Distress] : no acute distress [No JVD] : no jugular venous distention [No Respiratory Distress] : no respiratory distress  [Soft] : abdomen soft [Non Tender] : non-tender [No Joint Swelling] : no joint swelling

## 2023-03-06 NOTE — HISTORY OF PRESENT ILLNESS
[de-identified] : 67-year-old, Female, with hx HTN, HLD, DM II, and SLE with proteinuria presents to clinic for pre-op clearance; \par \par Scheduled for left eye cataract extraction on 3/21; \par \par Patient reports no acute concerns besides joint pain at baseline. Patient also requested flu vaccine on this visit. \par \par Denies any complaints today; \par

## 2023-03-09 DIAGNOSIS — E11.9 TYPE 2 DIABETES MELLITUS WITHOUT COMPLICATIONS: ICD-10-CM

## 2023-03-09 DIAGNOSIS — Z00.00 ENCOUNTER FOR GENERAL ADULT MEDICAL EXAMINATION WITHOUT ABNORMAL FINDINGS: ICD-10-CM

## 2023-03-09 DIAGNOSIS — M32.9 SYSTEMIC LUPUS ERYTHEMATOSUS, UNSPECIFIED: ICD-10-CM

## 2023-03-09 DIAGNOSIS — I10 ESSENTIAL (PRIMARY) HYPERTENSION: ICD-10-CM

## 2023-03-09 DIAGNOSIS — E78.5 HYPERLIPIDEMIA, UNSPECIFIED: ICD-10-CM

## 2023-03-10 ENCOUNTER — OUTPATIENT (OUTPATIENT)
Dept: OUTPATIENT SERVICES | Facility: HOSPITAL | Age: 68
LOS: 1 days | End: 2023-03-10
Payer: MEDICARE

## 2023-03-10 ENCOUNTER — APPOINTMENT (OUTPATIENT)
Dept: RHEUMATOLOGY | Facility: CLINIC | Age: 68
End: 2023-03-10
Payer: MEDICARE

## 2023-03-10 ENCOUNTER — APPOINTMENT (OUTPATIENT)
Dept: RHEUMATOLOGY | Facility: CLINIC | Age: 68
End: 2023-03-10

## 2023-03-10 VITALS
TEMPERATURE: 96.8 F | SYSTOLIC BLOOD PRESSURE: 121 MMHG | BODY MASS INDEX: 29.41 KG/M2 | HEART RATE: 99 BPM | OXYGEN SATURATION: 98 % | WEIGHT: 166 LBS | HEIGHT: 63 IN | DIASTOLIC BLOOD PRESSURE: 81 MMHG

## 2023-03-10 DIAGNOSIS — Z00.00 ENCOUNTER FOR GENERAL ADULT MEDICAL EXAMINATION WITHOUT ABNORMAL FINDINGS: ICD-10-CM

## 2023-03-10 DIAGNOSIS — Z90.710 ACQUIRED ABSENCE OF BOTH CERVIX AND UTERUS: Chronic | ICD-10-CM

## 2023-03-10 DIAGNOSIS — Z90.49 ACQUIRED ABSENCE OF OTHER SPECIFIED PARTS OF DIGESTIVE TRACT: Chronic | ICD-10-CM

## 2023-03-10 DIAGNOSIS — Z98.890 OTHER SPECIFIED POSTPROCEDURAL STATES: Chronic | ICD-10-CM

## 2023-03-10 PROCEDURE — 99214 OFFICE O/P EST MOD 30 MIN: CPT | Mod: GC

## 2023-03-10 PROCEDURE — 99214 OFFICE O/P EST MOD 30 MIN: CPT

## 2023-03-10 NOTE — HISTORY OF PRESENT ILLNESS
[FreeTextEntry1] : 67 year old female\par \par PMH Lupus, DM, HTN, HLD \par \par Present for follow up \par \par Dx with lupus ~1997, followed with Dr. Shepard up until 2020 and was lost to follow up d/t the pandemic. \par Was treated with hydroxychloroquine (?200mg daily?) until 2 years ago she stopped taking it. \par Pt has noticed increased joint swelling/pain of the b/l elbows and knees and night time pain/swelling of the fingers b/l since stopping the medication.\par Pt endorses proteinuria which has been known and followed by Dr. Sherman her nephrologist since 1997. \par She has had two kidney biopsies last in 2007. Per pt, they have been normal does not show signs of lupus nephritis. She takes spironolactone 100mg bid for her proteinuria per pt.\par \par The patient was diagnosed with SLE in 1997. \par Symptoms include arthritis, arthralgias and sun sensitivity, \par No headaches, no rash, no chest pain, no shortness of breath, no abdominal pain and no fatigue. \par

## 2023-03-10 NOTE — END OF VISIT
[] : Resident [FreeTextEntry3] : 68 y/o woman presents for f/u of SLE. Pt used to follow up with Dr. Shepard and took hydroxychloroquine, but switched to [facility] rheumatology clinic in 2022 due to insurance issues. She was initially seen in 9/2022 after not taking hydroxychloroquine for 2 years. She was reporting pain and stiffness in her b/l elbows, knees and fingers. She was restarted on hydroxychloroquine 400 mg q day. Today, she reports improvement of her joint symptoms on hydroxychloroquine, and is feeling well overall aside from L knee pain and cracking. Her prior labs demonstrated negative double-stranded DNA, elevated C3 and normal C4, also elevated albumin/creatinine ratio  which is stable compared to prior testing. Pt's exam today is unremarkable. Pt's current dose of hydroxychloroquine exceeds the recommended 5 mg/kg dosing limit. \par - Advised pt to decrease her hydroxychloroquine dose to 400 mg every weekday and 200 mg daily on weekends\par - Prescribed topical diclofenac for L knee pain\par - f/u L knee x-ray\par - f/u repeat C3, C4, dsDNA, UA, UPCR, CBC, CMP\par \par f/u in 3 months [Time Spent: ___ minutes] : I have spent [unfilled] minutes of time on the encounter.

## 2023-03-10 NOTE — ASSESSMENT
[FreeTextEntry1] : 67 year old female with lupus, DM, HTN, HLD presents for follow up\par \par #SLE\par - Restarted on Hydroxychloroquine Sep 2022, 200mg Twice daily last cisit \par - Advised to do 200MG BID on weekday and 200mg daily on weekends -  5mg/kg dosing (script refilled today)\par \par - SLE Labs :\par - Alb/Cr ratio 15K \par - ESR: 70, C3 173, + cardiolipin AB , SSA 6.1\par - WNL : SYLVESTER, CRP , RF, CCP, Lupus anticoagulant , DS DNA, B2G ,Scleroderma AB, FLORA, Anticardiolipin IgA,M\par - following with Optho - will see March 21 for cataract surgery \par -f/u repeat Lupus labs \par \par # L knee pain \par - worsening and walking up slopes \par - Voltaren cream PRN started today \par - f/u XR knee \par \par -RTC 3mo\par

## 2023-03-10 NOTE — PHYSICAL EXAM
[General Appearance - Alert] : alert [General Appearance - In No Acute Distress] : in no acute distress [Respiration, Rhythm And Depth] : normal respiratory rhythm and effort [Abdomen Soft] : soft [Abdomen Tenderness] : non-tender [Musculoskeletal - Swelling] : no joint swelling seen [] : no rash [FreeTextEntry1] : L knee crepitus and mild pain on extension

## 2023-03-15 DIAGNOSIS — M25.569 PAIN IN UNSPECIFIED KNEE: ICD-10-CM

## 2023-03-15 DIAGNOSIS — M32.9 SYSTEMIC LUPUS ERYTHEMATOSUS, UNSPECIFIED: ICD-10-CM

## 2023-04-03 ENCOUNTER — APPOINTMENT (OUTPATIENT)
Dept: INTERNAL MEDICINE | Facility: CLINIC | Age: 68
End: 2023-04-03
Payer: MEDICARE

## 2023-04-03 ENCOUNTER — OUTPATIENT (OUTPATIENT)
Dept: OUTPATIENT SERVICES | Facility: HOSPITAL | Age: 68
LOS: 1 days | End: 2023-04-03
Payer: MEDICARE

## 2023-04-03 VITALS
OXYGEN SATURATION: 99 % | BODY MASS INDEX: 29.41 KG/M2 | DIASTOLIC BLOOD PRESSURE: 77 MMHG | HEIGHT: 63 IN | SYSTOLIC BLOOD PRESSURE: 131 MMHG | WEIGHT: 166 LBS | TEMPERATURE: 96.9 F | HEART RATE: 100 BPM

## 2023-04-03 DIAGNOSIS — Z90.49 ACQUIRED ABSENCE OF OTHER SPECIFIED PARTS OF DIGESTIVE TRACT: Chronic | ICD-10-CM

## 2023-04-03 DIAGNOSIS — Z98.890 OTHER SPECIFIED POSTPROCEDURAL STATES: Chronic | ICD-10-CM

## 2023-04-03 DIAGNOSIS — Z00.00 ENCOUNTER FOR GENERAL ADULT MEDICAL EXAMINATION WITHOUT ABNORMAL FINDINGS: ICD-10-CM

## 2023-04-03 DIAGNOSIS — Z90.710 ACQUIRED ABSENCE OF BOTH CERVIX AND UTERUS: Chronic | ICD-10-CM

## 2023-04-03 LAB
25(OH)D3 SERPL-MCNC: 18 NG/ML
ALBUMIN SERPL ELPH-MCNC: 4.4 G/DL
ALP BLD-CCNC: 85 U/L
ALT SERPL-CCNC: 23 U/L
ANION GAP SERPL CALC-SCNC: 15 MMOL/L
APTT BLD: 36.3 SEC
AST SERPL-CCNC: 19 U/L
BASOPHILS # BLD AUTO: 0.03 K/UL
BASOPHILS NFR BLD AUTO: 0.6 %
BILIRUB SERPL-MCNC: 0.3 MG/DL
BUN SERPL-MCNC: 10 MG/DL
CALCIUM SERPL-MCNC: 9.5 MG/DL
CHLORIDE SERPL-SCNC: 104 MMOL/L
CO2 SERPL-SCNC: 20 MMOL/L
CREAT SERPL-MCNC: 0.8 MG/DL
CREAT SPEC-SCNC: 102 MG/DL
EGFR: 81 ML/MIN/1.73M2
EOSINOPHIL # BLD AUTO: 0.06 K/UL
EOSINOPHIL NFR BLD AUTO: 1.3 %
ESTIMATED AVERAGE GLUCOSE: 151 MG/DL
GLUCOSE SERPL-MCNC: 98 MG/DL
HBA1C MFR BLD HPLC: 6.9 %
HCT VFR BLD CALC: 42.6 %
HGB BLD-MCNC: 13.6 G/DL
IMM GRANULOCYTES NFR BLD AUTO: 0.2 %
LYMPHOCYTES # BLD AUTO: 2.61 K/UL
LYMPHOCYTES NFR BLD AUTO: 54.9 %
MAN DIFF?: NORMAL
MCHC RBC-ENTMCNC: 30.3 PG
MCHC RBC-ENTMCNC: 31.9 G/DL
MCV RBC AUTO: 94.9 FL
MICROALBUMIN 24H UR DL<=1MG/L-MCNC: 157.2 MG/DL
MICROALBUMIN/CREAT 24H UR-RTO: 1534 MG/G
MONOCYTES # BLD AUTO: 0.33 K/UL
MONOCYTES NFR BLD AUTO: 6.9 %
NEUTROPHILS # BLD AUTO: 1.71 K/UL
NEUTROPHILS NFR BLD AUTO: 36.1 %
PLATELET # BLD AUTO: 312 K/UL
POTASSIUM SERPL-SCNC: 4.6 MMOL/L
PROT SERPL-MCNC: 7.4 G/DL
RBC # BLD: 4.49 M/UL
RBC # FLD: 13.7 %
SODIUM SERPL-SCNC: 139 MMOL/L
TSH SERPL-ACNC: 2.62 UIU/ML
WBC # FLD AUTO: 4.75 K/UL

## 2023-04-03 PROCEDURE — 99213 OFFICE O/P EST LOW 20 MIN: CPT | Mod: GC

## 2023-04-03 PROCEDURE — 99213 OFFICE O/P EST LOW 20 MIN: CPT

## 2023-04-03 NOTE — HISTORY OF PRESENT ILLNESS
[FreeTextEntry4] : 68 yo F with PMHx HTN, DM, SLE presented to clinic for pre-op eval for Rt eye cataract sx. No new complaints.

## 2023-04-03 NOTE — ASSESSMENT
[FreeTextEntry4] : 68 yo F with PMHx HTN, DM, SLE presented to clinic for pre-op eval for Rt eye cataract sx. No new complaints.\par \par #Rt eye cataract\par BP is well controlled, Blood glucose is controlled\par Following up with rheum for SLE and disease is in controlled state\par Moderate ronna-operative risk patient for low risk surgery\par proceed as scheduled

## 2023-04-04 DIAGNOSIS — E11.9 TYPE 2 DIABETES MELLITUS WITHOUT COMPLICATIONS: ICD-10-CM

## 2023-04-04 DIAGNOSIS — I10 ESSENTIAL (PRIMARY) HYPERTENSION: ICD-10-CM

## 2023-05-02 ENCOUNTER — APPOINTMENT (OUTPATIENT)
Dept: INTERNAL MEDICINE | Facility: CLINIC | Age: 68
End: 2023-05-02

## 2023-05-15 ENCOUNTER — RX RENEWAL (OUTPATIENT)
Age: 68
End: 2023-05-15

## 2023-06-05 ENCOUNTER — RX RENEWAL (OUTPATIENT)
Age: 68
End: 2023-06-05

## 2023-06-05 NOTE — H&P PST ADULT - NS MD HP HEP C STATUS
Reason for Disposition   MODERATE-SEVERE abdominal pain (e g , interferes with normal activities, awakens from sleep)    Protocols used: PREGNANCY - ABDOMINAL PAIN GREATER THAN 20 WEEKS EGA-ADULT-AH
Unknown
- - -

## 2023-06-16 ENCOUNTER — OUTPATIENT (OUTPATIENT)
Dept: OUTPATIENT SERVICES | Facility: HOSPITAL | Age: 68
LOS: 1 days | End: 2023-06-16
Payer: MEDICARE

## 2023-06-16 ENCOUNTER — OUTPATIENT (OUTPATIENT)
Dept: OUTPATIENT SERVICES | Facility: HOSPITAL | Age: 68
LOS: 1 days | End: 2023-06-16
Payer: MEDICAID

## 2023-06-16 ENCOUNTER — RESULT REVIEW (OUTPATIENT)
Age: 68
End: 2023-06-16

## 2023-06-16 ENCOUNTER — LABORATORY RESULT (OUTPATIENT)
Age: 68
End: 2023-06-16

## 2023-06-16 DIAGNOSIS — M25.569 PAIN IN UNSPECIFIED KNEE: ICD-10-CM

## 2023-06-16 DIAGNOSIS — M32.9 SYSTEMIC LUPUS ERYTHEMATOSUS, UNSPECIFIED: ICD-10-CM

## 2023-06-16 DIAGNOSIS — Z98.890 OTHER SPECIFIED POSTPROCEDURAL STATES: Chronic | ICD-10-CM

## 2023-06-16 DIAGNOSIS — Z90.710 ACQUIRED ABSENCE OF BOTH CERVIX AND UTERUS: Chronic | ICD-10-CM

## 2023-06-16 DIAGNOSIS — Z90.49 ACQUIRED ABSENCE OF OTHER SPECIFIED PARTS OF DIGESTIVE TRACT: Chronic | ICD-10-CM

## 2023-06-16 PROCEDURE — 81001 URINALYSIS AUTO W/SCOPE: CPT

## 2023-06-16 PROCEDURE — 73562 X-RAY EXAM OF KNEE 3: CPT | Mod: 26,50

## 2023-06-16 PROCEDURE — 80053 COMPREHEN METABOLIC PANEL: CPT

## 2023-06-16 PROCEDURE — 36415 COLL VENOUS BLD VENIPUNCTURE: CPT

## 2023-06-16 PROCEDURE — 86225 DNA ANTIBODY NATIVE: CPT

## 2023-06-16 PROCEDURE — 85027 COMPLETE CBC AUTOMATED: CPT

## 2023-06-16 PROCEDURE — 73562 X-RAY EXAM OF KNEE 3: CPT | Mod: 50

## 2023-06-16 PROCEDURE — 86038 ANTINUCLEAR ANTIBODIES: CPT

## 2023-06-16 PROCEDURE — 86160 COMPLEMENT ANTIGEN: CPT

## 2023-06-17 DIAGNOSIS — M32.9 SYSTEMIC LUPUS ERYTHEMATOSUS, UNSPECIFIED: ICD-10-CM

## 2023-06-17 DIAGNOSIS — M25.569 PAIN IN UNSPECIFIED KNEE: ICD-10-CM

## 2023-06-19 LAB
ALBUMIN SERPL ELPH-MCNC: 4.4 G/DL
ALP BLD-CCNC: 83 U/L
ALT SERPL-CCNC: 24 U/L
ANA SER IF-ACNC: NEGATIVE
ANION GAP SERPL CALC-SCNC: 13 MMOL/L
APPEARANCE: CLEAR
AST SERPL-CCNC: 20 U/L
BILIRUB SERPL-MCNC: 0.2 MG/DL
BILIRUBIN URINE: NEGATIVE
BLOOD URINE: NEGATIVE
BUN SERPL-MCNC: 11 MG/DL
C3 SERPL-MCNC: 173 MG/DL
C4 SERPL-MCNC: 34 MG/DL
CALCIUM SERPL-MCNC: 10.3 MG/DL
CHLORIDE SERPL-SCNC: 101 MMOL/L
CO2 SERPL-SCNC: 24 MMOL/L
COLOR: YELLOW
CREAT SERPL-MCNC: 0.8 MG/DL
DSDNA AB SER-ACNC: <12 IU/ML
EGFR: 81 ML/MIN/1.73M2
GLUCOSE QUALITATIVE U: NEGATIVE MG/DL
GLUCOSE SERPL-MCNC: 89 MG/DL
KETONES URINE: NEGATIVE MG/DL
LEUKOCYTE ESTERASE URINE: NEGATIVE
NITRITE URINE: NEGATIVE
PH URINE: 6
POTASSIUM SERPL-SCNC: 5.2 MMOL/L
PROT SERPL-MCNC: 7.4 G/DL
PROTEIN URINE: 300 MG/DL
SODIUM SERPL-SCNC: 138 MMOL/L
SPECIFIC GRAVITY URINE: 1.02
UROBILINOGEN URINE: 0.2 MG/DL

## 2023-06-30 ENCOUNTER — OUTPATIENT (OUTPATIENT)
Dept: OUTPATIENT SERVICES | Facility: HOSPITAL | Age: 68
LOS: 1 days | End: 2023-06-30
Payer: MEDICARE

## 2023-06-30 ENCOUNTER — APPOINTMENT (OUTPATIENT)
Dept: RHEUMATOLOGY | Facility: CLINIC | Age: 68
End: 2023-06-30
Payer: MEDICARE

## 2023-06-30 VITALS
SYSTOLIC BLOOD PRESSURE: 128 MMHG | WEIGHT: 164 LBS | DIASTOLIC BLOOD PRESSURE: 67 MMHG | HEART RATE: 74 BPM | HEIGHT: 63 IN | OXYGEN SATURATION: 98 % | BODY MASS INDEX: 29.06 KG/M2 | TEMPERATURE: 98.7 F

## 2023-06-30 DIAGNOSIS — Z98.890 OTHER SPECIFIED POSTPROCEDURAL STATES: Chronic | ICD-10-CM

## 2023-06-30 DIAGNOSIS — M25.569 PAIN IN UNSPECIFIED KNEE: ICD-10-CM

## 2023-06-30 DIAGNOSIS — Z00.00 ENCOUNTER FOR GENERAL ADULT MEDICAL EXAMINATION WITHOUT ABNORMAL FINDINGS: ICD-10-CM

## 2023-06-30 PROCEDURE — 99214 OFFICE O/P EST MOD 30 MIN: CPT | Mod: GC

## 2023-06-30 PROCEDURE — 99214 OFFICE O/P EST MOD 30 MIN: CPT

## 2023-06-30 RX ORDER — FAMOTIDINE 40 MG/1
40 TABLET, FILM COATED ORAL
Qty: 90 | Refills: 3 | Status: DISCONTINUED | COMMUNITY
Start: 2021-12-29 | End: 2023-06-30

## 2023-06-30 RX ORDER — POLYETHYLENE GLYCOL 3350 17 G/17G
17 POWDER, FOR SOLUTION ORAL
Qty: 1 | Refills: 0 | Status: DISCONTINUED | COMMUNITY
Start: 2020-11-24 | End: 2023-06-30

## 2023-06-30 RX ORDER — DAPAGLIFLOZIN 10 MG/1
10 TABLET, FILM COATED ORAL
Qty: 90 | Refills: 3 | Status: DISCONTINUED | COMMUNITY
Start: 2021-09-06 | End: 2023-06-30

## 2023-06-30 NOTE — PHYSICAL EXAM
[General Appearance - Alert] : alert [General Appearance - Well Nourished] : well nourished [General Appearance - Well Developed] : well developed [General Appearance - Well-Appearing] : healthy appearing [Sclera] : the sclera and conjunctiva were normal [Neck Appearance] : the appearance of the neck was normal [Neck Cervical Mass (___cm)] : no neck mass was observed [Respiration, Rhythm And Depth] : normal respiratory rhythm and effort [Exaggerated Use Of Accessory Muscles For Inspiration] : no accessory muscle use [Auscultation Breath Sounds / Voice Sounds] : lungs were clear to auscultation bilaterally [Heart Rate And Rhythm] : heart rate was normal and rhythm regular [Heart Sounds] : normal S1 and S2 [Murmurs] : no murmurs [Bowel Sounds] : normal bowel sounds [Abdomen Soft] : soft [Abdomen Tenderness] : non-tender [] : no hepato-splenomegaly

## 2023-06-30 NOTE — ASSESSMENT
[FreeTextEntry1] : Patient is a 68 year old female with lupus, DM, HTN, HLD presenting for follow up\par \par #SLE\par - On Hydroxycholoquine 200mg twice daily \par - In her last visit in March 20233 we recommended to do Hydroxychloroquine 200mg twice daily during the weekdays and 200mg daily on weekends - 5mg/kg dosing - but she is still taking 200mg twice daily\par - SLE Labs done MArch 2023: C3 174, C4 34, dsDNA negative, protein 300 on UA\par - Follow up repeat lupus labs (C3, C4, CBC, CMP, ESR, CRP, dsDNA)\par - Recommended ophthalmology follow up for eye exam, patient said she has an apt next month\par \par \par #Left knee pain \par - Knee XR (6/16/23): Mild to moderate osteoarthritis\par - Continue topical diclofenac and Tylenol PRN\par \par \par #HCM\par - Return to clinic in 4 months \par \par

## 2023-06-30 NOTE — HISTORY OF PRESENT ILLNESS
[FreeTextEntry1] : Patient is a 66yo female known to have lupus, DM, HTN, HLD, presenting for follow up.\par \par She was diagnosed with lupus in 1997. She was on hydroxychloroquine but stopped it around 2020. Hydroxychloroquine restarted in Sept 2022. She has proteinuria which is followed up by her nephrologist, and takes Spironolactone 100mg BID for this,\par She has left knee pain that increases at the end of the day or after activity. She also has bilateral medial elbow pain, she reports its been the same mild pain for around 2 years now. \par She also reports having sun sensitivity.\par \par Patient had her Cataract surgery done in April 2023.\par \par No headaches, no rashes, no chest pain, no shortness of breath, no abdominal pain, no diarrhea/constipation.

## 2023-06-30 NOTE — REVIEW OF SYSTEMS
[Eyes Itch] : itching of the eyes [Heartburn] : heartburn [Joint Pain] : joint pain [Negative] : Neurological [Fever] : no fever [Chills] : no chills [Recent Weight Gain (___ Lbs)] : no recent weight gain [Recent Weight Loss (___ Lbs)] : no recent weight loss [Eye Pain] : no eye pain [Chest Pain] : no chest pain [Palpitations] : no palpitations [Shortness Of Breath] : no shortness of breath [Wheezing] : no wheezing [Cough] : no cough [Abdominal Pain] : no abdominal pain [Constipation] : no constipation [Diarrhea] : no diarrhea [Dysuria] : no dysuria [Incontinence] : no incontinence [Skin Lesions] : no skin lesions

## 2023-06-30 NOTE — END OF VISIT
[] : Resident [FreeTextEntry3] : 66 y/o woman presents for f/u of SLE. Pt used to follow up with Dr. Shepard and took hydroxychloroquine, but switched to [facility] rheumatology clinic in 2022 due to insurance issues. She was initially seen in 9/2022 after not taking hydroxychloroquine for 2 years. She was reporting pain and stiffness in her b/l elbows, knees and fingers. She was restarted on hydroxychloroquine 400 mg q day with improvement of her symptoms. Today, pt says that she has been feeling well, aside from continued L knee pain which improves with topical diclofenac. Also + mild PIP and elbow pain but pt's symptoms are tolerable overall. She continues to take hydroxychloroquine 400 mg q day instead of the decreased dose previously discussed. b/l knee x-rays demonstrated mild-moderate patellofemoral OA.\par - Continue hydroxychloroquine, again discussed with pt that I would recommend to decrease the dose as her current dose is slightly higher than 5 mg/kg. Previously we discussed her taking 400 mg on weekdays and 200 mg on weekends\par - Pt has eye appt in July 2023\par - f/u CBC, CMP, dsDNA, C3, C4, UA, UPCR, also ESR and CRP as pt's C3 has been slightly elevated on prior labs\par - Continue topical Voltaren for L knee pain\par \par f/u in 4 months\par  [Time Spent: ___ minutes] : I have spent [unfilled] minutes of time on the encounter.

## 2023-07-05 DIAGNOSIS — M32.9 SYSTEMIC LUPUS ERYTHEMATOSUS, UNSPECIFIED: ICD-10-CM

## 2023-07-05 DIAGNOSIS — M25.569 PAIN IN UNSPECIFIED KNEE: ICD-10-CM

## 2023-09-01 ENCOUNTER — APPOINTMENT (OUTPATIENT)
Dept: INTERNAL MEDICINE | Facility: CLINIC | Age: 68
End: 2023-09-01

## 2023-11-27 ENCOUNTER — APPOINTMENT (OUTPATIENT)
Dept: INTERNAL MEDICINE | Facility: CLINIC | Age: 68
End: 2023-11-27
Payer: MEDICARE

## 2023-11-27 ENCOUNTER — OUTPATIENT (OUTPATIENT)
Dept: OUTPATIENT SERVICES | Facility: HOSPITAL | Age: 68
LOS: 1 days | End: 2023-11-27
Payer: MEDICARE

## 2023-11-27 VITALS
HEART RATE: 81 BPM | OXYGEN SATURATION: 98 % | DIASTOLIC BLOOD PRESSURE: 80 MMHG | SYSTOLIC BLOOD PRESSURE: 128 MMHG | HEIGHT: 63 IN | TEMPERATURE: 98 F | BODY MASS INDEX: 29.41 KG/M2 | WEIGHT: 166 LBS

## 2023-11-27 DIAGNOSIS — Z23 ENCOUNTER FOR IMMUNIZATION: ICD-10-CM

## 2023-11-27 DIAGNOSIS — Z90.710 ACQUIRED ABSENCE OF BOTH CERVIX AND UTERUS: Chronic | ICD-10-CM

## 2023-11-27 DIAGNOSIS — Z90.49 ACQUIRED ABSENCE OF OTHER SPECIFIED PARTS OF DIGESTIVE TRACT: Chronic | ICD-10-CM

## 2023-11-27 DIAGNOSIS — Z00.00 ENCOUNTER FOR GENERAL ADULT MEDICAL EXAMINATION WITHOUT ABNORMAL FINDINGS: ICD-10-CM

## 2023-11-27 DIAGNOSIS — Z98.890 OTHER SPECIFIED POSTPROCEDURAL STATES: Chronic | ICD-10-CM

## 2023-11-27 PROCEDURE — 99214 OFFICE O/P EST MOD 30 MIN: CPT

## 2023-11-27 PROCEDURE — 90471 IMMUNIZATION ADMIN: CPT | Mod: 25

## 2023-11-27 RX ORDER — MULTIVIT-MIN/IRON/FOLIC ACID/K 18-600-40
50 MCG CAPSULE ORAL DAILY
Qty: 30 | Refills: 3 | Status: ACTIVE | COMMUNITY
Start: 2023-11-27 | End: 1900-01-01

## 2023-11-30 DIAGNOSIS — R92.8 OTHER ABNORMAL AND INCONCLUSIVE FINDINGS ON DIAGNOSTIC IMAGING OF BREAST: ICD-10-CM

## 2023-11-30 DIAGNOSIS — R91.1 SOLITARY PULMONARY NODULE: ICD-10-CM

## 2023-11-30 DIAGNOSIS — Z23 ENCOUNTER FOR IMMUNIZATION: ICD-10-CM

## 2023-12-29 ENCOUNTER — APPOINTMENT (OUTPATIENT)
Dept: RHEUMATOLOGY | Facility: CLINIC | Age: 68
End: 2023-12-29

## 2024-01-09 ENCOUNTER — APPOINTMENT (OUTPATIENT)
Dept: PULMONOLOGY | Facility: CLINIC | Age: 69
End: 2024-01-09

## 2024-01-09 DIAGNOSIS — R06.00 DYSPNEA, UNSPECIFIED: ICD-10-CM

## 2024-01-22 ENCOUNTER — APPOINTMENT (OUTPATIENT)
Dept: PULMONOLOGY | Facility: HOSPITAL | Age: 69
End: 2024-01-22
Payer: MEDICARE

## 2024-01-22 ENCOUNTER — OUTPATIENT (OUTPATIENT)
Dept: OUTPATIENT SERVICES | Facility: HOSPITAL | Age: 69
LOS: 1 days | End: 2024-01-22
Payer: MEDICARE

## 2024-01-22 DIAGNOSIS — Z90.49 ACQUIRED ABSENCE OF OTHER SPECIFIED PARTS OF DIGESTIVE TRACT: Chronic | ICD-10-CM

## 2024-01-22 DIAGNOSIS — Z90.710 ACQUIRED ABSENCE OF BOTH CERVIX AND UTERUS: Chronic | ICD-10-CM

## 2024-01-22 DIAGNOSIS — R06.00 DYSPNEA, UNSPECIFIED: ICD-10-CM

## 2024-01-22 DIAGNOSIS — Z98.890 OTHER SPECIFIED POSTPROCEDURAL STATES: Chronic | ICD-10-CM

## 2024-01-22 PROCEDURE — 94729 DIFFUSING CAPACITY: CPT | Mod: 26

## 2024-01-22 PROCEDURE — 94664 DEMO&/EVAL PT USE INHALER: CPT

## 2024-01-22 PROCEDURE — 94726 PLETHYSMOGRAPHY LUNG VOLUMES: CPT | Mod: 26

## 2024-01-22 PROCEDURE — 94060 EVALUATION OF WHEEZING: CPT | Mod: 26

## 2024-01-22 PROCEDURE — 94726 PLETHYSMOGRAPHY LUNG VOLUMES: CPT

## 2024-01-22 PROCEDURE — 94070 EVALUATION OF WHEEZING: CPT

## 2024-01-22 PROCEDURE — 94729 DIFFUSING CAPACITY: CPT

## 2024-01-23 DIAGNOSIS — R06.00 DYSPNEA, UNSPECIFIED: ICD-10-CM

## 2024-01-29 ENCOUNTER — OUTPATIENT (OUTPATIENT)
Dept: OUTPATIENT SERVICES | Facility: HOSPITAL | Age: 69
LOS: 1 days | End: 2024-01-29
Payer: MEDICARE

## 2024-01-29 ENCOUNTER — RESULT REVIEW (OUTPATIENT)
Age: 69
End: 2024-01-29

## 2024-01-29 DIAGNOSIS — Z90.49 ACQUIRED ABSENCE OF OTHER SPECIFIED PARTS OF DIGESTIVE TRACT: Chronic | ICD-10-CM

## 2024-01-29 DIAGNOSIS — Z90.710 ACQUIRED ABSENCE OF BOTH CERVIX AND UTERUS: Chronic | ICD-10-CM

## 2024-01-29 DIAGNOSIS — Z12.31 ENCOUNTER FOR SCREENING MAMMOGRAM FOR MALIGNANT NEOPLASM OF BREAST: ICD-10-CM

## 2024-01-29 DIAGNOSIS — Z98.890 OTHER SPECIFIED POSTPROCEDURAL STATES: Chronic | ICD-10-CM

## 2024-01-29 PROCEDURE — 77067 SCR MAMMO BI INCL CAD: CPT

## 2024-01-29 PROCEDURE — 77067 SCR MAMMO BI INCL CAD: CPT | Mod: 26

## 2024-01-29 PROCEDURE — 77063 BREAST TOMOSYNTHESIS BI: CPT | Mod: 26

## 2024-01-29 PROCEDURE — 77063 BREAST TOMOSYNTHESIS BI: CPT

## 2024-01-30 DIAGNOSIS — Z12.31 ENCOUNTER FOR SCREENING MAMMOGRAM FOR MALIGNANT NEOPLASM OF BREAST: ICD-10-CM

## 2024-02-07 RX ORDER — PIOGLITAZONE HYDROCHLORIDE 30 MG/1
30 TABLET ORAL
Qty: 90 | Refills: 1 | Status: ACTIVE | COMMUNITY
Start: 2020-11-11 | End: 1900-01-01

## 2024-02-23 ENCOUNTER — APPOINTMENT (OUTPATIENT)
Dept: RHEUMATOLOGY | Facility: CLINIC | Age: 69
End: 2024-02-23
Payer: MEDICARE

## 2024-02-23 ENCOUNTER — OUTPATIENT (OUTPATIENT)
Dept: OUTPATIENT SERVICES | Facility: HOSPITAL | Age: 69
LOS: 1 days | End: 2024-02-23
Payer: MEDICARE

## 2024-02-23 VITALS
TEMPERATURE: 98.2 F | HEART RATE: 72 BPM | SYSTOLIC BLOOD PRESSURE: 124 MMHG | HEIGHT: 63 IN | OXYGEN SATURATION: 98 % | WEIGHT: 172 LBS | BODY MASS INDEX: 30.48 KG/M2 | DIASTOLIC BLOOD PRESSURE: 75 MMHG

## 2024-02-23 DIAGNOSIS — Z90.710 ACQUIRED ABSENCE OF BOTH CERVIX AND UTERUS: Chronic | ICD-10-CM

## 2024-02-23 DIAGNOSIS — M17.9 OSTEOARTHRITIS OF KNEE, UNSPECIFIED: ICD-10-CM

## 2024-02-23 DIAGNOSIS — Z00.00 ENCOUNTER FOR GENERAL ADULT MEDICAL EXAMINATION WITHOUT ABNORMAL FINDINGS: ICD-10-CM

## 2024-02-23 DIAGNOSIS — Z98.890 OTHER SPECIFIED POSTPROCEDURAL STATES: Chronic | ICD-10-CM

## 2024-02-23 DIAGNOSIS — Z90.49 ACQUIRED ABSENCE OF OTHER SPECIFIED PARTS OF DIGESTIVE TRACT: Chronic | ICD-10-CM

## 2024-02-23 PROCEDURE — 99214 OFFICE O/P EST MOD 30 MIN: CPT

## 2024-02-23 NOTE — REVIEW OF SYSTEMS
[Eyes Itch] : itching of the eyes [Arthralgias] : arthralgias [Joint Pain] : joint pain [Joint Swelling] : joint swelling [Joint Stiffness] : joint stiffness [Fever] : no fever [Chills] : no chills [Eye Pain] : no eye pain [Dry Eyes] : no dryness of the eyes [Red Eyes] : eyes not red [Earache] : no earache [Loss Of Hearing] : no hearing loss [Chest Pain] : no chest pain [Palpitations] : no palpitations [Lower Ext Edema] : no lower extremity edema [Shortness Of Breath] : no shortness of breath [Cough] : no cough [Wheezing] : no wheezing [Orthopnea] : no orthopnea [PND] : no PND [Abdominal Pain] : no abdominal pain [Vomiting] : no vomiting [Diarrhea] : no diarrhea [Heartburn] : no heartburn [Dysuria] : no dysuria [Skin Lesions] : no skin lesions [Itching] : no itching [Convulsions] : no convulsions [Suicidal] : not suicidal [Easy Bleeding] : no tendency for easy bleeding [Easy Bruising] : no tendency for easy bruising

## 2024-02-23 NOTE — HISTORY OF PRESENT ILLNESS
[FreeTextEntry1] : 68-year-old, Female, with hx HTN, HLD, DM II, Hepatic steatosis , GERD , knee OA and SLE with proteinuria presents to clinic for follow up. She was diagnosed with lupus in 1997. She was on hydroxychloroquine but stopped it around 2020. Hydroxychloroquine restarted in Sept 2022. She has proteinuria, which is followed up by her nephrologist, and takes Spironolactone 100mg BID for this,  Today she is having pain in her left hand fingers, it has been going on for about a month, associated with stifness and swelling , isn't relieved by Tylenol  Has been taking the hydroxychloroquine as prescribed 200mg twice daily during the weekdays and 200mg daily on weekends - 5mg/kg dosing   has occasional skin blisters over the face.  She has left knee pain that increases at the end of the day or after activity > xray done showed mild-mod OA.   Patient had her Cataract surgery done in April 2023.  No headaches, no rashes, no chest pain, no shortness of breath, no abdominal pain, no diarrhea/constipation.

## 2024-02-23 NOTE — END OF VISIT
[FreeTextEntry3] : 68 y/o woman presents for f/u of SLE. Pt used to follow up with Dr. Shepard and took hydroxychloroquine, but switched to [facility] rheumatology clinic in 2022 due to insurance issues. She was initially seen in 9/2022 after not taking hydroxychloroquine for 2 years. She was reporting pain and stiffness in her b/l elbows, knees and fingers. She was restarted on hydroxychloroquine with improvement of her symptoms. She was last seen in clinic 6/2023, when she was feeling well. However, today she reports that for the past month, she has noticed stiffness in her R 4th and 5th fingers, and she is unable to fully flex these fingers. + Long-standing triggering in R 2nd finger, pt denies triggering in her R 4th or 5th fingers. She says she previously had these symptoms before and received a ?steroid injection to her hand with improvement. Her exam today is notable for mild diffuse edema in the R 4th and 5th fingers, with TTP throughout. Her exam is otherwise unremarkable. Pt's symptoms and exam are not specific for either SLE or another type of inflammatory arthritis, and are focal. Unclear what kind of injection she received in the past as her symptoms are not local to any specific joint, and are no suggestive of trigger finger.  - Continue hydroxychloroquine 400 mg on weekdays and 200 mg on weekends - Pt has eye appt in March 2024 - Advised pt to try Celebrex 100 mg BID prn for her hand swelling - f/u labs for SLE, also vitamin D as pt's vitamin D was low on 4/2023 labs - Referred pt for repeat DEXA scan. She had a DEXA scan in 2016 which was wnl  f/u in 3 months  [] : Resident [Time Spent: ___ minutes] : I have spent [unfilled] minutes of time on the encounter.

## 2024-02-23 NOTE — PHYSICAL EXAM
[General Appearance - Alert] : alert [Sclera] : the sclera and conjunctiva were normal [Murmurs] : no murmurs [Heart Sounds] : normal S1 and S2 [Abdomen Soft] : soft [Abdomen Tenderness] : non-tender [] : no hepato-splenomegaly [No CVA Tenderness] : no ~M costovertebral angle tenderness [Musculoskeletal - Swelling] : no joint swelling seen [No Focal Deficits] : no focal deficits [Oriented To Time, Place, And Person] : oriented to person, place, and time

## 2024-02-23 NOTE — ASSESSMENT
[FreeTextEntry1] : 68-year-old, Female, with hx HTN, HLD, DM II, Hepatic steatosis, GERD, knee OA and SLE with proteinuria presents to clinic for follow up.  #SLE - now with pain in left fingers, NSAID's ( Celebrex 100 mg one to two times a day PRN )  - In her last visit in June 2023 it was recommended to do Hydroxychloroquine 200mg twice daily during the weekdays and 200mg daily on weekends - 5mg/kg dosing - now she is taking it as explained  - SLE Labs done June 2023: C3 173, C4 34, dsDNA negative, protein 300 on UA, not repeated since  - Follow up repeat lupus labs (C3, C4, CBC, CMP, ESR, CRP, dsDNA, Protien/Cr ratio, vitamin D )  - ophthalmology follow up for eye exam > scheduled on March 3rd 2024   #Left knee pain/ Mild to Mod OA  - Knee XR (6/16/23): Mild to moderate osteoarthritis - Continue topical diclofenac and Tylenol PRN  #Vit D def - Vit D = 18 on April 2023 - repeat vit D level - c/w vitamin D supplements.   #HCM - Repeat DEXA scan (last done on 2016 and was normal )  - Return to clinic in 3 months   .

## 2024-02-26 DIAGNOSIS — E55.9 VITAMIN D DEFICIENCY, UNSPECIFIED: ICD-10-CM

## 2024-02-26 DIAGNOSIS — M17.9 OSTEOARTHRITIS OF KNEE, UNSPECIFIED: ICD-10-CM

## 2024-02-26 DIAGNOSIS — M32.9 SYSTEMIC LUPUS ERYTHEMATOSUS, UNSPECIFIED: ICD-10-CM

## 2024-03-18 ENCOUNTER — RX RENEWAL (OUTPATIENT)
Age: 69
End: 2024-03-18

## 2024-03-20 ENCOUNTER — OUTPATIENT (OUTPATIENT)
Dept: OUTPATIENT SERVICES | Facility: HOSPITAL | Age: 69
LOS: 1 days | End: 2024-03-20
Payer: MEDICARE

## 2024-03-20 VITALS
HEART RATE: 75 BPM | HEIGHT: 63 IN | WEIGHT: 174.17 LBS | TEMPERATURE: 98 F | SYSTOLIC BLOOD PRESSURE: 140 MMHG | RESPIRATION RATE: 18 BRPM | DIASTOLIC BLOOD PRESSURE: 73 MMHG | OXYGEN SATURATION: 98 %

## 2024-03-20 DIAGNOSIS — N02.9 RECURRENT AND PERSISTENT HEMATURIA WITH UNSPECIFIED MORPHOLOGIC CHANGES: ICD-10-CM

## 2024-03-20 DIAGNOSIS — Z90.49 ACQUIRED ABSENCE OF OTHER SPECIFIED PARTS OF DIGESTIVE TRACT: Chronic | ICD-10-CM

## 2024-03-20 DIAGNOSIS — Z98.890 OTHER SPECIFIED POSTPROCEDURAL STATES: Chronic | ICD-10-CM

## 2024-03-20 DIAGNOSIS — Z01.818 ENCOUNTER FOR OTHER PREPROCEDURAL EXAMINATION: ICD-10-CM

## 2024-03-20 DIAGNOSIS — Z90.710 ACQUIRED ABSENCE OF BOTH CERVIX AND UTERUS: Chronic | ICD-10-CM

## 2024-03-20 LAB
ALBUMIN SERPL ELPH-MCNC: 4.2 G/DL — SIGNIFICANT CHANGE UP (ref 3.5–5.2)
ALP SERPL-CCNC: 80 U/L — SIGNIFICANT CHANGE UP (ref 30–115)
ALT FLD-CCNC: 21 U/L — SIGNIFICANT CHANGE UP (ref 0–41)
ANION GAP SERPL CALC-SCNC: 12 MMOL/L — SIGNIFICANT CHANGE UP (ref 7–14)
APTT BLD: 33.3 SEC — SIGNIFICANT CHANGE UP (ref 27–39.2)
AST SERPL-CCNC: 17 U/L — SIGNIFICANT CHANGE UP (ref 0–41)
BASOPHILS # BLD AUTO: 0.02 K/UL — SIGNIFICANT CHANGE UP (ref 0–0.2)
BASOPHILS NFR BLD AUTO: 0.6 % — SIGNIFICANT CHANGE UP (ref 0–1)
BILIRUB SERPL-MCNC: 0.3 MG/DL — SIGNIFICANT CHANGE UP (ref 0.2–1.2)
BUN SERPL-MCNC: 12 MG/DL — SIGNIFICANT CHANGE UP (ref 10–20)
CALCIUM SERPL-MCNC: 9.6 MG/DL — SIGNIFICANT CHANGE UP (ref 8.4–10.5)
CHLORIDE SERPL-SCNC: 103 MMOL/L — SIGNIFICANT CHANGE UP (ref 98–110)
CO2 SERPL-SCNC: 23 MMOL/L — SIGNIFICANT CHANGE UP (ref 17–32)
CREAT SERPL-MCNC: 0.6 MG/DL — LOW (ref 0.7–1.5)
EGFR: 98 ML/MIN/1.73M2 — SIGNIFICANT CHANGE UP
EOSINOPHIL # BLD AUTO: 0.04 K/UL — SIGNIFICANT CHANGE UP (ref 0–0.7)
EOSINOPHIL NFR BLD AUTO: 1.1 % — SIGNIFICANT CHANGE UP (ref 0–8)
GLUCOSE SERPL-MCNC: 129 MG/DL — HIGH (ref 70–99)
HCT VFR BLD CALC: 41.2 % — SIGNIFICANT CHANGE UP (ref 37–47)
HGB BLD-MCNC: 13.6 G/DL — SIGNIFICANT CHANGE UP (ref 12–16)
IMM GRANULOCYTES NFR BLD AUTO: 0.3 % — SIGNIFICANT CHANGE UP (ref 0.1–0.3)
INR BLD: 0.92 RATIO — SIGNIFICANT CHANGE UP (ref 0.65–1.3)
LYMPHOCYTES # BLD AUTO: 1.61 K/UL — SIGNIFICANT CHANGE UP (ref 1.2–3.4)
LYMPHOCYTES # BLD AUTO: 46 % — SIGNIFICANT CHANGE UP (ref 20.5–51.1)
MCHC RBC-ENTMCNC: 30.4 PG — SIGNIFICANT CHANGE UP (ref 27–31)
MCHC RBC-ENTMCNC: 33 G/DL — SIGNIFICANT CHANGE UP (ref 32–37)
MCV RBC AUTO: 92.2 FL — SIGNIFICANT CHANGE UP (ref 81–99)
MONOCYTES # BLD AUTO: 0.29 K/UL — SIGNIFICANT CHANGE UP (ref 0.1–0.6)
MONOCYTES NFR BLD AUTO: 8.3 % — SIGNIFICANT CHANGE UP (ref 1.7–9.3)
NEUTROPHILS # BLD AUTO: 1.53 K/UL — SIGNIFICANT CHANGE UP (ref 1.4–6.5)
NEUTROPHILS NFR BLD AUTO: 43.7 % — SIGNIFICANT CHANGE UP (ref 42.2–75.2)
NRBC # BLD: 0 /100 WBCS — SIGNIFICANT CHANGE UP (ref 0–0)
PLATELET # BLD AUTO: 285 K/UL — SIGNIFICANT CHANGE UP (ref 130–400)
PMV BLD: 9.8 FL — SIGNIFICANT CHANGE UP (ref 7.4–10.4)
POTASSIUM SERPL-MCNC: 4.6 MMOL/L — SIGNIFICANT CHANGE UP (ref 3.5–5)
POTASSIUM SERPL-SCNC: 4.6 MMOL/L — SIGNIFICANT CHANGE UP (ref 3.5–5)
PROT SERPL-MCNC: 7 G/DL — SIGNIFICANT CHANGE UP (ref 6–8)
PROTHROM AB SERPL-ACNC: 10.5 SEC — SIGNIFICANT CHANGE UP (ref 9.95–12.87)
RBC # BLD: 4.47 M/UL — SIGNIFICANT CHANGE UP (ref 4.2–5.4)
RBC # FLD: 13.6 % — SIGNIFICANT CHANGE UP (ref 11.5–14.5)
SODIUM SERPL-SCNC: 138 MMOL/L — SIGNIFICANT CHANGE UP (ref 135–146)
WBC # BLD: 3.5 K/UL — LOW (ref 4.8–10.8)
WBC # FLD AUTO: 3.5 K/UL — LOW (ref 4.8–10.8)

## 2024-03-20 PROCEDURE — 85610 PROTHROMBIN TIME: CPT

## 2024-03-20 PROCEDURE — 99214 OFFICE O/P EST MOD 30 MIN: CPT | Mod: 25

## 2024-03-20 PROCEDURE — 80053 COMPREHEN METABOLIC PANEL: CPT

## 2024-03-20 PROCEDURE — 85025 COMPLETE CBC W/AUTO DIFF WBC: CPT

## 2024-03-20 PROCEDURE — 36415 COLL VENOUS BLD VENIPUNCTURE: CPT

## 2024-03-20 PROCEDURE — 85730 THROMBOPLASTIN TIME PARTIAL: CPT

## 2024-03-20 PROCEDURE — 93005 ELECTROCARDIOGRAM TRACING: CPT

## 2024-03-20 PROCEDURE — 93010 ELECTROCARDIOGRAM REPORT: CPT

## 2024-03-20 RX ORDER — BENAZEPRIL HYDROCHLORIDE 40 MG/1
1 TABLET ORAL
Qty: 0 | Refills: 0 | DISCHARGE

## 2024-03-20 RX ORDER — SPIRONOLACTONE 25 MG/1
1 TABLET, FILM COATED ORAL
Qty: 0 | Refills: 0 | DISCHARGE

## 2024-03-20 RX ORDER — SEMAGLUTIDE 0.68 MG/ML
0 INJECTION, SOLUTION SUBCUTANEOUS
Qty: 0 | Refills: 0 | DISCHARGE

## 2024-03-20 RX ORDER — OMEPRAZOLE 10 MG/1
1 CAPSULE, DELAYED RELEASE ORAL
Qty: 0 | Refills: 0 | DISCHARGE

## 2024-03-20 RX ORDER — PENTOXIFYLLINE 400 MG
0 TABLET, EXTENDED RELEASE ORAL
Qty: 0 | Refills: 0 | DISCHARGE

## 2024-03-20 RX ORDER — METFORMIN HYDROCHLORIDE 850 MG/1
1 TABLET ORAL
Qty: 0 | Refills: 0 | DISCHARGE

## 2024-03-20 RX ORDER — HYDROXYCHLOROQUINE SULFATE 200 MG
1 TABLET ORAL
Qty: 0 | Refills: 0 | DISCHARGE

## 2024-03-20 RX ORDER — ATORVASTATIN CALCIUM 80 MG/1
1 TABLET, FILM COATED ORAL
Qty: 0 | Refills: 0 | DISCHARGE

## 2024-03-20 NOTE — H&P PST ADULT - NSICDXPASTMEDICALHX_GEN_ALL_CORE_FT
PAST MEDICAL HISTORY:  Acid reflux     Diabetes     Former smoker     High cholesterol     HTN (hypertension)     Lupus

## 2024-03-20 NOTE — H&P PST ADULT - HISTORY OF PRESENT ILLNESS
PATIENT CURRENTLY DENIES CHEST PAIN  SHORTNESS OF BREATH  PALPITATIONS,  DYSURIA, OR UPPER RESPIRATORY INFECTION IN PAST 2 WEEKS  denies travel outside the [place] in the past 30 days  Denies fevers, chills or any symptoms of recent UTI's or any rashes.   Denies near syncope or syncope episodes.   SCHEDULED FOR 3/28RENAL CORE BIOPSY NON TARGET. HISTORY LUPUS AND PROTEIN IN URINE  advised hold diabetic meds day of procedure  advised preop advise by MD for trental preop procedure .    Anesthesia Alert  NO--Difficult Airway  NO--History of neck surgery or radiation  NO--Limited ROM of neck  NO--History of Malignant hyperthermia  NO--No personal or family history of Pseudocholinesterase deficiency.  NO--Prior Anesthesia Complication  NO--Latex Allergy  NO--Loose teeth  NO--History of Rheumatoid Arthritis  yes--Bleeding risk  pentoxifylline daily  unknown --KELVIN  -admits snoring-denies work up  NO--Other_____    PT DENIES ANY RASHES, ABRASION, OR OPEN WOUNDS OR CUTS    AS PER THE PT, THIS IS HIS/HER COMPLETE MEDICAL AND SURGICAL HX, INCLUDING MEDICATIONS PRESCRIBED AND OVER THE COUNTER    Patient verbalized understanding of instructions and was given the opportunity to ask questions and have them answered.    pt denies any suicidal ideation or thoughts, pt states not a threat to self or others    Duke Activity Status Index (DASI) from [employer]  on 3/20/2024  ** All calculations should be rechecked by clinician prior to use **    RESULT SUMMARY:  26.95 points  The higher the score (maximum 58.2), the higher the functional status.    6.05 METs        INPUTS:  Take care of self —> 2.75 = Yes  Walk indoors —> 1.75 = Yes  Walk 1&ndash;2 blocks on level ground —> 2.75 = Yes  Climb a flight of stairs or walk up a hill —> 5.5 = Yes  Run a short distance —> 0 = No  Do light work around the house —> 2.7 = Yes  Do moderate work around the house —> 3.5 = Yes  Do heavy work around the house —> 8 = Yes  Do yardwork —> 0 = No  Have sexual relations —> 0 = No  Participate in moderate recreational activities —> 0 = No  Participate in strenuous sports —> 0 = No    Revised Cardiac Risk Index for Pre-Operative Risk from [employer]  on 3/20/2024  ** All calculations should be rechecked by clinician prior to use **    RESULT SUMMARY:  1 points  Class II Risk    6.0 %  30-day risk of death, MI, or cardiac arrest    From Duceppe 2017. These numbers are higher than those from the original study (Damon 1999). See Evidence for details.      INPUTS:  Elevated-risk surgery —> 0 = No  History of ischemic heart disease —> 0 = No  History of congestive heart failure —> 0 = No  History of cerebrovascular disease —> 1 = Yes  Pre-operative treatment with insulin —> 0 = No  Pre-operative creatinine >2 mg/dL / 176.8 µmol/L —> 0 = No

## 2024-03-20 NOTE — H&P PST ADULT - NSICDXPASTSURGICALHX_GEN_ALL_CORE_FT
PAST SURGICAL HISTORY:  H/O: hysterectomy     History of benign breast biopsy     History of cholecystectomy

## 2024-03-21 ENCOUNTER — RX RENEWAL (OUTPATIENT)
Age: 69
End: 2024-03-21

## 2024-03-21 DIAGNOSIS — Z01.818 ENCOUNTER FOR OTHER PREPROCEDURAL EXAMINATION: ICD-10-CM

## 2024-03-21 DIAGNOSIS — N02.9 RECURRENT AND PERSISTENT HEMATURIA WITH UNSPECIFIED MORPHOLOGIC CHANGES: ICD-10-CM

## 2024-03-28 ENCOUNTER — OUTPATIENT (OUTPATIENT)
Dept: OUTPATIENT SERVICES | Facility: HOSPITAL | Age: 69
LOS: 1 days | Discharge: HOME | End: 2024-03-28
Payer: MEDICARE

## 2024-03-28 ENCOUNTER — RESULT REVIEW (OUTPATIENT)
Age: 69
End: 2024-03-28

## 2024-03-28 ENCOUNTER — TRANSCRIPTION ENCOUNTER (OUTPATIENT)
Age: 69
End: 2024-03-28

## 2024-03-28 VITALS
TEMPERATURE: 98 F | SYSTOLIC BLOOD PRESSURE: 121 MMHG | OXYGEN SATURATION: 88 % | RESPIRATION RATE: 18 BRPM | HEIGHT: 63 IN | WEIGHT: 167.99 LBS | HEART RATE: 85 BPM | DIASTOLIC BLOOD PRESSURE: 68 MMHG

## 2024-03-28 VITALS
TEMPERATURE: 97 F | SYSTOLIC BLOOD PRESSURE: 132 MMHG | RESPIRATION RATE: 18 BRPM | OXYGEN SATURATION: 97 % | DIASTOLIC BLOOD PRESSURE: 61 MMHG | HEART RATE: 76 BPM

## 2024-03-28 DIAGNOSIS — Z90.49 ACQUIRED ABSENCE OF OTHER SPECIFIED PARTS OF DIGESTIVE TRACT: Chronic | ICD-10-CM

## 2024-03-28 DIAGNOSIS — Z98.890 OTHER SPECIFIED POSTPROCEDURAL STATES: Chronic | ICD-10-CM

## 2024-03-28 DIAGNOSIS — R80.9 PROTEINURIA, UNSPECIFIED: ICD-10-CM

## 2024-03-28 DIAGNOSIS — Z90.710 ACQUIRED ABSENCE OF BOTH CERVIX AND UTERUS: Chronic | ICD-10-CM

## 2024-03-28 DIAGNOSIS — N02.9 RECURRENT AND PERSISTENT HEMATURIA WITH UNSPECIFIED MORPHOLOGIC CHANGES: ICD-10-CM

## 2024-03-28 LAB — GLUCOSE BLDC GLUCOMTR-MCNC: 136 MG/DL — HIGH (ref 70–99)

## 2024-03-28 PROCEDURE — 88350 IMFLUOR EA ADDL 1ANTB STN PX: CPT | Mod: 26

## 2024-03-28 PROCEDURE — 82962 GLUCOSE BLOOD TEST: CPT

## 2024-03-28 PROCEDURE — 99152 MOD SED SAME PHYS/QHP 5/>YRS: CPT

## 2024-03-28 PROCEDURE — 88346 IMFLUOR 1ST 1ANTB STAIN PX: CPT

## 2024-03-28 PROCEDURE — 88350 IMFLUOR EA ADDL 1ANTB STN PX: CPT

## 2024-03-28 PROCEDURE — 88313 SPECIAL STAINS GROUP 2: CPT | Mod: 26

## 2024-03-28 PROCEDURE — 88346 IMFLUOR 1ST 1ANTB STAIN PX: CPT | Mod: 26

## 2024-03-28 PROCEDURE — 88348 ELECTRON MICROSCOPY DX: CPT

## 2024-03-28 PROCEDURE — 77012 CT SCAN FOR NEEDLE BIOPSY: CPT

## 2024-03-28 PROCEDURE — 88348 ELECTRON MICROSCOPY DX: CPT | Mod: 26

## 2024-03-28 PROCEDURE — 50200 RENAL BIOPSY PERQ: CPT | Mod: LT

## 2024-03-28 PROCEDURE — 88305 TISSUE EXAM BY PATHOLOGIST: CPT

## 2024-03-28 PROCEDURE — 77012 CT SCAN FOR NEEDLE BIOPSY: CPT | Mod: 26

## 2024-03-28 PROCEDURE — 88305 TISSUE EXAM BY PATHOLOGIST: CPT | Mod: 26

## 2024-03-28 PROCEDURE — 88313 SPECIAL STAINS GROUP 2: CPT

## 2024-03-28 PROCEDURE — 99153 MOD SED SAME PHYS/QHP EA: CPT

## 2024-03-28 RX ORDER — ATORVASTATIN CALCIUM 80 MG/1
1 TABLET, FILM COATED ORAL
Refills: 0 | DISCHARGE

## 2024-03-28 RX ORDER — BENAZEPRIL HYDROCHLORIDE 40 MG/1
1 TABLET ORAL
Refills: 0 | DISCHARGE

## 2024-03-28 RX ORDER — HYDROXYCHLOROQUINE SULFATE 200 MG
1 TABLET ORAL
Refills: 0 | DISCHARGE

## 2024-03-28 RX ORDER — METFORMIN HYDROCHLORIDE 850 MG/1
1 TABLET ORAL
Refills: 0 | DISCHARGE

## 2024-03-28 RX ORDER — SPIRONOLACTONE 25 MG/1
1 TABLET, FILM COATED ORAL
Refills: 0 | DISCHARGE

## 2024-03-28 RX ORDER — OMEPRAZOLE 10 MG/1
1 CAPSULE, DELAYED RELEASE ORAL
Refills: 0 | DISCHARGE

## 2024-03-28 RX ORDER — PIOGLITAZONE HYDROCHLORIDE 15 MG/1
1 TABLET ORAL
Refills: 0 | DISCHARGE

## 2024-03-28 RX ORDER — PENTOXIFYLLINE 400 MG
1 TABLET, EXTENDED RELEASE ORAL
Refills: 0 | DISCHARGE

## 2024-03-28 NOTE — PROGRESS NOTE ADULT - SUBJECTIVE AND OBJECTIVE BOX
PROCEDURE: CT-guided biopsy    Procedural Personnel  Attending physician(s): Isaac Gonzalez  Fellow physician(s): None  Resident physician(s): None  Advanced practice provider(s): None    Pre-procedure diagnosis: Proteinuria  Post-procedure diagnosis: Same  Previous biopsy of same target (QCDR): No  Additional clinical history: None    Complications: No immediate complications.    IMPRESSION:    CT-guided nontarget biopsy of the left kidney.    Plan:     Specimen(s) sent for evaluation. Will monitor for 4 hours post procedure.  _______________________________________________________________    PROCEDURE SUMMARY:  - Percutaneous CT-guided coaxial core needle biopsy  - Additional procedure(s): None    PROCEDURE DETAILS:    Pre-procedure  Consent: Informed consent for the procedure including risks, benefits and alternatives was obtained and time-out was performed prior to the procedure.  Preparation: The site was prepared and draped using maximal sterile barrier technique including cutaneous antisepsis.    Anesthesia/sedation  Level of anesthesia/sedation: Moderate sedation (conscious sedation)  Anesthesia/sedation administered by: Independent trained observer under attending supervision with continuous monitoring of the patient’s level of consciousness and physiologic status  Total intra-service sedation time (minutes): 30    Imaging prior to biopsy  The patient was positioned prone. Initial imaging was performed using noncontrast CT.  Biopsy target: Left lower pole renal cortex.    Biopsy   Local anesthesia was administered. Under CT guidance, the biopsy needle was advanced to the target and biopsy was performed.  Coaxial needle: 19 gauge    Core needle biopsy device: BuildFax  Core needle size: 20 gauge  Number of core specimens: 3    On-site biopsy touch preparation: Yes    Additional sampling recommendations: None  Preliminary assessment of sample adequacy: Adequate    Needle removal  The biopsy needle was removed and a sterile dressing was applied.  Tract embolization: Gelfoam slurry    Imaging following biopsy  Immediate post-biopsy imaging was performed using noncontrast CT.  Post-biopsy imaging findings: Expected postbiopsy findings without large perinephric hematoma

## 2024-03-28 NOTE — ASU DISCHARGE PLAN (ADULT/PEDIATRIC) - NS MD DC FALL RISK RISK
For information on Fall & Injury Prevention, visit: [email or web address] OR  [email or web address] OR  https://www.cdc.gov/steadi/patient.html

## 2024-03-28 NOTE — ASU PATIENT PROFILE, ADULT - FALL HARM RISK - ATTEMPT OOB
(SYMBICORT) 160-4.5 MCG/ACT AERO Inhale 2 puffs into the lungs 2 times daily 1 Inhaler 12    meclizine (ANTIVERT) 12.5 MG tablet Take 1 tablet by mouth 3 times daily as needed for Dizziness 30 tablet 5    ondansetron (ZOFRAN) 4 MG tablet Take 1 tablet by mouth every 8 hours as needed for Nausea or Vomiting 30 tablet 3    ibuprofen (ADVIL;MOTRIN) 600 MG tablet Take 1 tablet by mouth every 6 hours as needed for Pain 20 tablet 0    acetaminophen (APAP EXTRA STRENGTH) 500 MG tablet Take 2 tablets by mouth every 6 hours as needed for Pain DO NOT TAKE WITH OTHER MEDICATIONS CONTAINING ACETAMINOPHEN. 30 tablet 0    clomiPRAMINE (ANAFRANIL) 25 MG capsule 1 cap nightly for 1 week, then 2 caps nightly. 60 capsule 2    prazosin (MINIPRESS) 1 MG capsule Take 1 capsule by mouth nightly 30 capsule 3    pantoprazole (PROTONIX) 40 MG tablet       baclofen (LIORESAL) 10 MG tablet prn      ferrous sulfate 325 (65 FE) MG EC tablet Take 325 mg by mouth 3 times daily (with meals)      VITAMIN D, ERGOCALCIFEROL, PO Take by mouth once a week      SUMAtriptan (IMITREX) 50 MG tablet Take 50 mg by mouth once as needed for Migraine      montelukast (SINGULAIR) 10 MG tablet Take 10 mg by mouth nightly       No current facility-administered medications for this visit. Objective:     She is alert, oriented x 3, pleasant, well nourished, developed and in no acute distress. /79   Pulse 96   Temp 98.2 °F (36.8 °C) (Temporal)   Ht 5' 5\" (1.651 m)   Wt 192 lb (87.1 kg)   LMP 02/02/2016   BMI 31.95 kg/m²      ANKLE EXAM:  Examination of the left ankle demonstrates: There is mild swelling of the ankle. There is  no joint effusion. There is mild tenderness of the lateral malleolus. There is no tenderness of the medial malleolus. There is no tenderness of the fifth metatarsal.  There is mild tenderness over the ATFL. There is no tenderness over the proximal fibula. There is no tenderness of the calcaneous. No

## 2024-03-28 NOTE — ASU PATIENT PROFILE, ADULT - FALL HARM RISK - UNIVERSAL INTERVENTIONS
Bed in lowest position, wheels locked, appropriate side rails in place/Call bell, personal items and telephone in reach/Instruct patient to call for assistance before getting out of bed or chair/Non-slip footwear when patient is out of bed/[place] to call system/Physically safe environment - no spills, clutter or unnecessary equipment/Purposeful Proactive Rounding/Room/bathroom lighting operational, light cord in reach

## 2024-03-28 NOTE — ASU DISCHARGE PLAN (ADULT/PEDIATRIC) - ASU DC SPECIAL INSTRUCTIONSFT
If hematuria persists for more than 3 days please call us    Do not take anticoagulation or antiplatelets for 48 hours

## 2024-03-28 NOTE — PRE PROCEDURE NOTE - PRE PROCEDURE EVALUATION
69yo F PMH lupus and + protein in urine presents today for non target renal core biopsy.     Plan for image guided non target liver core biopsy with conscious sedation / anesthesia today 3/28 67yo F PMH lupus and + protein in urine presents today for non target renal core biopsy.     Plan for image guided non target renal core biopsy with conscious sedation / anesthesia today 3/28

## 2024-03-30 DIAGNOSIS — N02.9 RECURRENT AND PERSISTENT HEMATURIA WITH UNSPECIFIED MORPHOLOGIC CHANGES: ICD-10-CM

## 2024-05-17 ENCOUNTER — APPOINTMENT (OUTPATIENT)
Dept: RHEUMATOLOGY | Facility: CLINIC | Age: 69
End: 2024-05-17

## 2024-05-20 ENCOUNTER — OUTPATIENT (OUTPATIENT)
Dept: OUTPATIENT SERVICES | Facility: HOSPITAL | Age: 69
LOS: 1 days | End: 2024-05-20
Payer: MEDICARE

## 2024-05-20 ENCOUNTER — APPOINTMENT (OUTPATIENT)
Dept: INTERNAL MEDICINE | Facility: CLINIC | Age: 69
End: 2024-05-20
Payer: MEDICARE

## 2024-05-20 ENCOUNTER — RX RENEWAL (OUTPATIENT)
Age: 69
End: 2024-05-20

## 2024-05-20 VITALS
TEMPERATURE: 96 F | HEART RATE: 87 BPM | WEIGHT: 173 LBS | DIASTOLIC BLOOD PRESSURE: 74 MMHG | BODY MASS INDEX: 30.65 KG/M2 | OXYGEN SATURATION: 98 % | SYSTOLIC BLOOD PRESSURE: 112 MMHG | HEIGHT: 63 IN

## 2024-05-20 DIAGNOSIS — R80.9 PROTEINURIA, UNSPECIFIED: ICD-10-CM

## 2024-05-20 DIAGNOSIS — R74.8 ABNORMAL LEVELS OF OTHER SERUM ENZYMES: ICD-10-CM

## 2024-05-20 DIAGNOSIS — Z00.00 ENCOUNTER FOR GENERAL ADULT MEDICAL EXAMINATION WITHOUT ABNORMAL FINDINGS: ICD-10-CM

## 2024-05-20 DIAGNOSIS — E66.9 OBESITY, UNSPECIFIED: ICD-10-CM

## 2024-05-20 DIAGNOSIS — Z90.49 ACQUIRED ABSENCE OF OTHER SPECIFIED PARTS OF DIGESTIVE TRACT: Chronic | ICD-10-CM

## 2024-05-20 DIAGNOSIS — Z12.11 ENCOUNTER FOR SCREENING FOR MALIGNANT NEOPLASM OF COLON: ICD-10-CM

## 2024-05-20 DIAGNOSIS — K64.8 OTHER HEMORRHOIDS: ICD-10-CM

## 2024-05-20 DIAGNOSIS — R76.8 OTHER SPECIFIED ABNORMAL IMMUNOLOGICAL FINDINGS IN SERUM: ICD-10-CM

## 2024-05-20 DIAGNOSIS — I10 ESSENTIAL (PRIMARY) HYPERTENSION: ICD-10-CM

## 2024-05-20 DIAGNOSIS — K29.70 GASTRITIS, UNSPECIFIED, W/OUT BLEEDING: ICD-10-CM

## 2024-05-20 DIAGNOSIS — E04.1 NONTOXIC SINGLE THYROID NODULE: ICD-10-CM

## 2024-05-20 DIAGNOSIS — Z90.710 ACQUIRED ABSENCE OF BOTH CERVIX AND UTERUS: Chronic | ICD-10-CM

## 2024-05-20 DIAGNOSIS — K31.7 POLYP OF STOMACH AND DUODENUM: ICD-10-CM

## 2024-05-20 DIAGNOSIS — R76.0 RAISED ANTIBODY TITER: ICD-10-CM

## 2024-05-20 DIAGNOSIS — M32.9 SYSTEMIC LUPUS ERYTHEMATOSUS, UNSPECIFIED: ICD-10-CM

## 2024-05-20 DIAGNOSIS — E11.9 TYPE 2 DIABETES MELLITUS W/OUT COMPLICATIONS: ICD-10-CM

## 2024-05-20 DIAGNOSIS — E55.9 VITAMIN D DEFICIENCY, UNSPECIFIED: ICD-10-CM

## 2024-05-20 DIAGNOSIS — Z98.890 OTHER SPECIFIED POSTPROCEDURAL STATES: Chronic | ICD-10-CM

## 2024-05-20 DIAGNOSIS — R91.1 SOLITARY PULMONARY NODULE: ICD-10-CM

## 2024-05-20 DIAGNOSIS — Z87.898 PERSONAL HISTORY OF OTHER SPECIFIED CONDITIONS: ICD-10-CM

## 2024-05-20 PROCEDURE — 99214 OFFICE O/P EST MOD 30 MIN: CPT

## 2024-05-20 PROCEDURE — 99214 OFFICE O/P EST MOD 30 MIN: CPT | Mod: GC

## 2024-05-20 RX ORDER — OMEPRAZOLE 20 MG/1
20 CAPSULE, DELAYED RELEASE ORAL TWICE DAILY
Qty: 60 | Refills: 0 | Status: ACTIVE | COMMUNITY
Start: 2024-05-20

## 2024-05-20 RX ORDER — CELECOXIB 100 MG/1
100 CAPSULE ORAL TWICE DAILY
Qty: 60 | Refills: 1 | Status: COMPLETED | COMMUNITY
Start: 2024-02-23 | End: 2024-05-20

## 2024-05-20 RX ORDER — DAPAGLIFLOZIN AND METFORMIN HYDROCHLORIDE 5; 1000 MG/1; MG/1
5-1000 TABLET, FILM COATED, EXTENDED RELEASE ORAL DAILY
Qty: 180 | Refills: 3 | Status: COMPLETED | COMMUNITY
Start: 2021-12-29 | End: 2024-05-20

## 2024-05-20 RX ORDER — HYDROXYCHLOROQUINE SULFATE 200 MG/1
200 TABLET, FILM COATED ORAL
Qty: 180 | Refills: 0 | Status: ACTIVE | COMMUNITY
Start: 2023-06-30 | End: 1900-01-01

## 2024-05-20 RX ORDER — METFORMIN HYDROCHLORIDE 1000 MG/1
1000 TABLET, COATED ORAL
Qty: 60 | Refills: 3 | Status: ACTIVE | COMMUNITY
Start: 2024-05-20

## 2024-05-20 RX ORDER — PIOGLITAZONE HYDROCHLORIDE 30 MG/1
30 TABLET ORAL DAILY
Qty: 90 | Refills: 2 | Status: ACTIVE | COMMUNITY
Start: 2024-05-20

## 2024-05-20 RX ORDER — SEMAGLUTIDE 1.34 MG/ML
4 INJECTION, SOLUTION SUBCUTANEOUS
Qty: 9 | Refills: 3 | Status: COMPLETED | COMMUNITY
Start: 2021-05-13 | End: 2024-05-20

## 2024-05-20 RX ORDER — DICLOFENAC SODIUM 1% 10 MG/G
1 GEL TOPICAL
Qty: 2 | Refills: 0 | Status: COMPLETED | COMMUNITY
Start: 2023-03-10 | End: 2024-05-20

## 2024-05-20 RX ORDER — OLMESARTAN MEDOXOMIL 20 MG/1
20 TABLET, FILM COATED ORAL DAILY
Qty: 30 | Refills: 0 | Status: ACTIVE | COMMUNITY
Start: 2024-05-20

## 2024-05-20 RX ORDER — EMPAGLIFLOZIN 10 MG/1
10 TABLET, FILM COATED ORAL DAILY
Qty: 30 | Refills: 0 | Status: ACTIVE | COMMUNITY
Start: 2024-05-20

## 2024-05-20 RX ORDER — OMEPRAZOLE 40 MG/1
40 CAPSULE, DELAYED RELEASE ORAL
Qty: 30 | Refills: 3 | Status: COMPLETED | COMMUNITY
Start: 2019-12-16 | End: 2024-05-20

## 2024-05-20 RX ORDER — INFLUENZA VIRUS VACCINE 15; 15; 15; 15 UG/.5ML; UG/.5ML; UG/.5ML; UG/.5ML
0.5 SUSPENSION INTRAMUSCULAR
Qty: 1 | Refills: 0 | Status: COMPLETED | COMMUNITY
Start: 2022-11-08 | End: 2022-11-08

## 2024-05-20 NOTE — HISTORY OF PRESENT ILLNESS
[FreeTextEntry1] : follow up  visit [de-identified] : 68-year-old, Female, with hx HTN, HLD, DM II, Obesity, SLE?, albuminuria, Dysphagia, Dyspnea, hx Transaminitis, Hepatic steatosis?, heartburn, Lung nodule, thyroid nodule, Vit D deficiency, hx shingles presenting for follow up.  Complains of right ear pain and swelling, no otorrhea.

## 2024-05-20 NOTE — REVIEW OF SYSTEMS
[Joint Stiffness] : joint stiffness [Earache] : earache [Fever] : no fever [Chills] : no chills [Discharge] : no discharge [Chest Pain] : no chest pain [Palpitations] : no palpitations [Lower Ext Edema] : no lower extremity edema [Shortness Of Breath] : no shortness of breath [Abdominal Pain] : no abdominal pain [Nausea] : no nausea [Constipation] : no constipation [Diarrhea] : diarrhea [Vomiting] : no vomiting

## 2024-05-20 NOTE — ASSESSMENT
[FreeTextEntry1] : # Diabetes Mellitus -Hba1c 6.7 -continue Metformin, pioglitazone, Jardiance -f/u Endocrine Dr. Laboy - check Urine Alb:Cr, Serum B12 - Doesn't see ophthalmology, sent referral - Denies need for podiatry - diet and exercise  # Thyroid nodule in Jan 2020 - last US nodule increased to 1.8 cm from 1.5 - Endo & ENT f/u - FNA performed in August 2021: No malignant cells - TSH (March 7)- wnl  # Dyslipidemia - Continue Atorvastatin 80mg po once daily  # GERD - continue omeprazole - had EGD and colono 2020- no abnormal findings  # Hepatic steatosis - c/w pioglitazone diet and exercise - CLD segura previously ordered by GI was not done. Will reorder [Ferritin, Iron, TIBC, HCV, Ig Panel, Hepatitis panel, smooth muscle Ab] [SYLVESTER, ceruloplasmin, transferrin, mitochondrial Ab anti-trypsin -ve,] - GI fu  # Hypertension -c/w Olmesartan  # Pulmonary nodule - CT chest Dec 2018: Right upper 2 mm calcified nodule, likely represents a granuloma. - former smoker 20 pack years. Quit in 2013. - Repeat CT scan 2021 showed no nodule - Repeat CT chest - f/u Pulm  # SLE? with proteinuria (ACR 1534)  with mild/mod OA of left knee - SYLVESTER -ve - cardiolipin Ab +ve - On Hydroxychloroquine - pt is on benazepril and spironolactone (prescribed by Nephrologist) - pt is following Rheumatology (Dr. Sanches) - Sees ophthalmology - Voltaren cream and Tylenol PRN started by Rheum- better now. - Nephro sees Dr Araujo - asked to get March Kidney Bx result, reports nephrologist to be unsure of Dx - recheck urine Alb:Cr, check Urine Pr:Cr. Later check 24 hr Protein:Cr  # Positive SSA - denies Sx [dental caries, dry eyes/mouth] - monitor for now  # Positive Cardiolipin - repeat APS panel (> 12 weeks have passed since last test) - cardiolipin Ab total + ve but IgG/M -ve Oct 31, 2022 - Heme referral  #Mild to mod Vit D deficiency - c/w Vit D - check levels  # Dyspnea, resolved - PFT wnl Jan 2024 - Pulm follow up  # Dysphagia, resolved - Was supposed to do manometry, didn't do it - -ve Endoscopy - GI fu  HCM: - Repeat Mammogram -ve - Pap smear: patient reports that she had total hysterectomy including cervix in 2006. - Last Colonoscopy 2020 normal, repeat recommended in 10 years (2030). - Flu vaccine up to date - Covid vaccine x 2 - TdaP today 5/20/24 - PCV20 today 5/20/24, PSV was on 12/16/2019 - HCV screening - DEXA scan ordered by Dr Sibley Return in 6 months and prn.

## 2024-05-20 NOTE — PHYSICAL EXAM
[No Acute Distress] : no acute distress [Well Nourished] : well nourished [Well-Appearing] : well-appearing [Normal Sclera/Conjunctiva] : normal sclera/conjunctiva [No Respiratory Distress] : no respiratory distress  [Normal Rate] : normal rate  [Regular Rhythm] : with a regular rhythm [Normal S1, S2] : normal S1 and S2 [No Murmur] : no murmur heard [No Varicosities] : no varicosities [No Edema] : there was no peripheral edema [Soft] : abdomen soft [Non Tender] : non-tender [de-identified] : clear TM, no erythema of auditory tube

## 2024-05-23 DIAGNOSIS — R80.9 PROTEINURIA, UNSPECIFIED: ICD-10-CM

## 2024-05-23 DIAGNOSIS — E11.9 TYPE 2 DIABETES MELLITUS WITHOUT COMPLICATIONS: ICD-10-CM

## 2024-05-23 DIAGNOSIS — R91.1 SOLITARY PULMONARY NODULE: ICD-10-CM

## 2024-05-23 DIAGNOSIS — E66.9 OBESITY, UNSPECIFIED: ICD-10-CM

## 2024-05-23 DIAGNOSIS — I10 ESSENTIAL (PRIMARY) HYPERTENSION: ICD-10-CM

## 2024-05-23 DIAGNOSIS — E55.9 VITAMIN D DEFICIENCY, UNSPECIFIED: ICD-10-CM

## 2024-05-23 DIAGNOSIS — M32.9 SYSTEMIC LUPUS ERYTHEMATOSUS, UNSPECIFIED: ICD-10-CM

## 2024-05-23 DIAGNOSIS — E04.1 NONTOXIC SINGLE THYROID NODULE: ICD-10-CM

## 2024-05-23 DIAGNOSIS — R74.8 ABNORMAL LEVELS OF OTHER SERUM ENZYMES: ICD-10-CM

## 2024-05-24 PROBLEM — Z87.891 PERSONAL HISTORY OF NICOTINE DEPENDENCE: Chronic | Status: ACTIVE | Noted: 2024-03-20

## 2024-06-10 ENCOUNTER — RX RENEWAL (OUTPATIENT)
Age: 69
End: 2024-06-10

## 2024-06-10 ENCOUNTER — NON-APPOINTMENT (OUTPATIENT)
Age: 69
End: 2024-06-10

## 2024-06-10 RX ORDER — DICLOFENAC SODIUM 20 MG/G
2 SOLUTION TOPICAL
Qty: 1 | Refills: 3 | Status: ACTIVE | COMMUNITY
Start: 2024-03-21 | End: 1900-01-01

## 2024-06-11 ENCOUNTER — OUTPATIENT (OUTPATIENT)
Dept: OUTPATIENT SERVICES | Facility: HOSPITAL | Age: 69
LOS: 1 days | End: 2024-06-11
Payer: MEDICARE

## 2024-06-11 ENCOUNTER — RESULT REVIEW (OUTPATIENT)
Age: 69
End: 2024-06-11

## 2024-06-11 DIAGNOSIS — Z90.49 ACQUIRED ABSENCE OF OTHER SPECIFIED PARTS OF DIGESTIVE TRACT: Chronic | ICD-10-CM

## 2024-06-11 DIAGNOSIS — E04.1 NONTOXIC SINGLE THYROID NODULE: ICD-10-CM

## 2024-06-11 DIAGNOSIS — Z90.710 ACQUIRED ABSENCE OF BOTH CERVIX AND UTERUS: Chronic | ICD-10-CM

## 2024-06-11 DIAGNOSIS — Z00.8 ENCOUNTER FOR OTHER GENERAL EXAMINATION: ICD-10-CM

## 2024-06-11 DIAGNOSIS — Z98.890 OTHER SPECIFIED POSTPROCEDURAL STATES: Chronic | ICD-10-CM

## 2024-06-11 PROCEDURE — 76536 US EXAM OF HEAD AND NECK: CPT

## 2024-06-11 PROCEDURE — 76536 US EXAM OF HEAD AND NECK: CPT | Mod: 26

## 2024-06-12 DIAGNOSIS — E04.1 NONTOXIC SINGLE THYROID NODULE: ICD-10-CM

## 2024-07-29 ENCOUNTER — RX RENEWAL (OUTPATIENT)
Age: 69
End: 2024-07-29

## 2024-08-06 ENCOUNTER — APPOINTMENT (OUTPATIENT)
Dept: PULMONOLOGY | Facility: CLINIC | Age: 69
End: 2024-08-06

## 2024-08-06 ENCOUNTER — OUTPATIENT (OUTPATIENT)
Dept: OUTPATIENT SERVICES | Facility: HOSPITAL | Age: 69
LOS: 1 days | End: 2024-08-06
Payer: MEDICARE

## 2024-08-06 DIAGNOSIS — Z90.49 ACQUIRED ABSENCE OF OTHER SPECIFIED PARTS OF DIGESTIVE TRACT: Chronic | ICD-10-CM

## 2024-08-06 DIAGNOSIS — Z98.890 OTHER SPECIFIED POSTPROCEDURAL STATES: Chronic | ICD-10-CM

## 2024-08-06 DIAGNOSIS — Z90.710 ACQUIRED ABSENCE OF BOTH CERVIX AND UTERUS: Chronic | ICD-10-CM

## 2024-08-06 DIAGNOSIS — Z00.00 ENCOUNTER FOR GENERAL ADULT MEDICAL EXAMINATION WITHOUT ABNORMAL FINDINGS: ICD-10-CM

## 2024-08-06 PROBLEM — Z12.2 ENCOUNTER FOR SCREENING FOR LUNG CANCER: Status: ACTIVE | Noted: 2024-08-06 | Resolved: 2024-09-05

## 2024-08-06 PROBLEM — G47.33 OBSTRUCTIVE SLEEP APNEA, ADULT: Status: ACTIVE | Noted: 2024-08-06

## 2024-08-06 PROBLEM — F17.211 TOBACCO DEPENDENCE DUE TO CIGARETTES, IN REMISSION: Status: ACTIVE | Noted: 2024-08-06

## 2024-08-06 PROCEDURE — 99213 OFFICE O/P EST LOW 20 MIN: CPT | Mod: 25

## 2024-08-06 PROCEDURE — 99213 OFFICE O/P EST LOW 20 MIN: CPT | Mod: GC

## 2024-08-06 PROCEDURE — G0296: CPT

## 2024-08-06 PROCEDURE — G0296 VISIT TO DETERM LDCT ELIG: CPT | Mod: GC

## 2024-08-06 NOTE — REVIEW OF SYSTEMS
[GERD] : gerd [Fever] : no fever [Dry Eyes] : no dry eyes [Eye Irritation] : no eye irritation [Cough] : no cough [Hemoptysis] : no hemoptysis [Dyspnea] : no dyspnea [Chest Discomfort] : no chest discomfort [Edema] : no edema [Orthopnea] : no orthopnea [Itchy Eyes] : no itchy eyes [Diarrhea] : no diarrhea [Food Intolerance] : no food intolerance [Nocturia] : no nocturia [Dysuria] : no dysuria [Arthralgias] : no arthralgias [Myalgias] : no myalgias [Raynaud] : no raynaud [Headache] : no headache [Dizziness] : no dizziness

## 2024-08-06 NOTE — ASSESSMENT
[FreeTextEntry1] : Patient is a 69-yo female with hx HTN, HLD, DM II, SLE follows with Dr. Sibley, Obesity, albuminuria, Dysphagia, Dyspnea, hx Transaminitis, heartburn, Lung nodule, thyroid nodule, Vit D deficiency, hx shingles who is presenting for follow up.  #Lung Cancer Screening - last screening CT scan in 2021, negative - pt is 69, 20+ pack year hx, quit <15 years ago (2013) - due for screening CT, will order  #Dyspnea, unspecified #r/o KELVIN - pt obese (BMI>30), snores, feels fatigued during day, hypertensive - STOPBANG of 4 - does not desire sleep study at this time - will revisit at next appointment

## 2024-08-06 NOTE — HISTORY OF PRESENT ILLNESS
[>= 20 pack years] : >= 20 pack years [Former] : former [TextBox_4] : Patient is a 69-yo female with hx HTN, HLD, DM II, SLE follows with Dr. Sibley, Obesity, albuminuria, Dysphagia, GERD, Lung nodule, thyroid nodule, Vit D deficiency, hx shingles who is presenting for follow up. Last seen by Pulmonology (Dr. Schroeder) in 01/21/22, screening CT scan done 12/15/21 showing no suspicious pulmonary nodules. PFTs ordered at that time for dyspnea  Pulmonary history: Patient is a former smoker, appx 20 pack years, quit in 2013, CT Chest 2018 showed a right upper 2mm calcified nodule likely representing a granuloma, repeat screening Chest CT 2021 showed no identifiable nodules.   PFTs performed 01/22/124: TLC within normal limits, spirometry markedly improved post-bronchodilator, normal PFTs.  Patient endorses no dyspnea today, occasionally when walking or when sleeping, likely secondary to her GERD. Does endorse feeling tired during the day, people have told her she snores, STOP-BANG score of 3/4. No other acute complaints.

## 2024-08-06 NOTE — PHYSICAL EXAM
[No Acute Distress] : no acute distress [Normal Oropharynx] : normal oropharynx [Normal Appearance] : normal appearance [No Neck Mass] : no neck mass [Normal Rate/Rhythm] : normal rate/rhythm [No Murmurs] : no murmurs [Normal S1, S2] : normal s1, s2 [No Resp Distress] : no resp distress [Clear to Auscultation Bilaterally] : clear to auscultation bilaterally [No Abnormalities] : no abnormalities [Benign] : benign [Normal Gait] : normal gait [No Clubbing] : no clubbing [Normal Color/ Pigmentation] : normal color/ pigmentation [No Focal Deficits] : no focal deficits [Oriented x3] : oriented x3 [Normal Affect] : normal affect [TextBox_2] : appears fatigued [TextBox_105] : mild joint stiffness b/l hands, normal ROM

## 2024-08-13 DIAGNOSIS — F17.211 NICOTINE DEPENDENCE, CIGARETTES, IN REMISSION: ICD-10-CM

## 2024-08-13 DIAGNOSIS — R06.00 DYSPNEA, UNSPECIFIED: ICD-10-CM

## 2024-08-13 DIAGNOSIS — Z12.2 ENCOUNTER FOR SCREENING FOR MALIGNANT NEOPLASM OF RESPIRATORY ORGANS: ICD-10-CM

## 2024-08-13 DIAGNOSIS — I10 ESSENTIAL (PRIMARY) HYPERTENSION: ICD-10-CM

## 2024-08-13 DIAGNOSIS — G47.33 OBSTRUCTIVE SLEEP APNEA (ADULT) (PEDIATRIC): ICD-10-CM

## 2024-09-04 ENCOUNTER — APPOINTMENT (OUTPATIENT)
Dept: RHEUMATOLOGY | Facility: CLINIC | Age: 69
End: 2024-09-04
Payer: MEDICARE

## 2024-09-04 VITALS
SYSTOLIC BLOOD PRESSURE: 124 MMHG | WEIGHT: 172 LBS | DIASTOLIC BLOOD PRESSURE: 72 MMHG | TEMPERATURE: 97.9 F | BODY MASS INDEX: 30.48 KG/M2 | OXYGEN SATURATION: 97 % | HEART RATE: 92 BPM | HEIGHT: 63 IN

## 2024-09-04 DIAGNOSIS — M85.80 OTHER SPECIFIED DISORDERS OF BONE DENSITY AND STRUCTURE, UNSPECIFIED SITE: ICD-10-CM

## 2024-09-04 DIAGNOSIS — Z00.00 ENCOUNTER FOR GENERAL ADULT MEDICAL EXAMINATION W/OUT ABNORMAL FINDINGS: ICD-10-CM

## 2024-09-04 DIAGNOSIS — E55.9 VITAMIN D DEFICIENCY, UNSPECIFIED: ICD-10-CM

## 2024-09-04 DIAGNOSIS — M32.9 SYSTEMIC LUPUS ERYTHEMATOSUS, UNSPECIFIED: ICD-10-CM

## 2024-09-04 PROCEDURE — 99214 OFFICE O/P EST MOD 30 MIN: CPT

## 2024-09-04 PROCEDURE — G2211 COMPLEX E/M VISIT ADD ON: CPT

## 2024-09-04 RX ORDER — GABAPENTIN 100 MG/1
100 CAPSULE ORAL
Qty: 30 | Refills: 2 | Status: ACTIVE | COMMUNITY
Start: 2024-09-04 | End: 1900-01-01

## 2024-09-04 NOTE — HISTORY OF PRESENT ILLNESS
[FreeTextEntry1] : Pt was initially diagnosed with SLE in 1997, reportedly had arthralgias and sun sensitivity. She used to follow with Dr. Shepard and then in the [facility], has been taking hydroxychloroquine for most of that time with an interruption due to Covid for 2 years. Pt also has a h/o proteinuria, has had three kidney biopsies none of which showed lupus nephritis, the most recent biopsy was in June 2024 and demonstrated diabetic nephropathy and IgA nephropathy with no activity and mild chronicity. Most recently, rheumatology-wise pt has been reporting pain in her L knee in the medial aspect, as well as pain and stiffness in her hands. She is unable to fully flex her R 5th finger, and has triggering in the L 2nd finger. She does not think the hydroxychloroquine makes any difference to her joint symptoms.  Previous treatments: - Hydroxychloroquine - no improvement in joint symptoms - Celebrex - no improvement - Pennsaid topical helps  Physical exam: GEN: Pleasant, AAO woman sitting on exam table in NAD HEAD: no alopecia MOUTH: + Dentures, moist mucous membranes, no oral ulcers PULM: Clear to auscultation b/l CV: Regular rate and rhythm, no murmurs MSK Shoulders: Full ROM b/l Elbows: Full ROM b/l, no effusions Wrists: Full ROM b/l, no effusions Hands: no synovitis Hips; Full ROM b/l Knees: no effusions, + Pain with ROM in L Ankles: no effusions, full ROM b/l Feet; no effusions, no TTP EXT: no LE edema b/l

## 2024-09-04 NOTE — ASSESSMENT
[FreeTextEntry1] : Knee and hand pain: suspect that pt's symptoms are most likely due to degenerative changes. She had b/l knee x-rays in 6/2023 which demonstrated mild-moderate b/l OA. Also had labs with negative SYLVESTER, normal-slightly elevated complements, negative dsDNA, negative Colon and RNP, negative Ro and La, negative APL labs. Pt has chronic proteinuria, s/p kidney biopsy in 6/2024 with no e/o lupus nephritis. Pt had diabetic nephropathy and IgA nephropathy with no activity and mild chronicity. - Discontinue hydroxychloroquine - Start gabapentin 100 mg qhs for joint pain, discussed adverse effects - Offered pt PT referral for L knee but she said she would prefer to do her own low-impact exercise e.g. bicycle - Continue Pennsaid for knee pain - f/u DEXA, referred at last appt but pt did not go, pt had DEXA in 2016 - f/u bloodwork   f/u in 2 months, will call pt with DEXA results

## 2024-09-10 ENCOUNTER — RESULT REVIEW (OUTPATIENT)
Age: 69
End: 2024-09-10

## 2024-09-10 ENCOUNTER — OUTPATIENT (OUTPATIENT)
Dept: OUTPATIENT SERVICES | Facility: HOSPITAL | Age: 69
LOS: 1 days | End: 2024-09-10
Payer: MEDICARE

## 2024-09-10 DIAGNOSIS — Z98.890 OTHER SPECIFIED POSTPROCEDURAL STATES: Chronic | ICD-10-CM

## 2024-09-10 DIAGNOSIS — Z13.820 ENCOUNTER FOR SCREENING FOR OSTEOPOROSIS: ICD-10-CM

## 2024-09-10 DIAGNOSIS — Z90.49 ACQUIRED ABSENCE OF OTHER SPECIFIED PARTS OF DIGESTIVE TRACT: Chronic | ICD-10-CM

## 2024-09-10 DIAGNOSIS — Z90.710 ACQUIRED ABSENCE OF BOTH CERVIX AND UTERUS: Chronic | ICD-10-CM

## 2024-09-10 DIAGNOSIS — Z00.8 ENCOUNTER FOR OTHER GENERAL EXAMINATION: ICD-10-CM

## 2024-09-10 PROCEDURE — 77080 DXA BONE DENSITY AXIAL: CPT

## 2024-09-10 PROCEDURE — 77080 DXA BONE DENSITY AXIAL: CPT | Mod: 26

## 2024-09-11 DIAGNOSIS — Z13.820 ENCOUNTER FOR SCREENING FOR OSTEOPOROSIS: ICD-10-CM

## 2024-11-01 ENCOUNTER — APPOINTMENT (OUTPATIENT)
Dept: GASTROENTEROLOGY | Facility: CLINIC | Age: 69
End: 2024-11-01

## 2024-11-14 ENCOUNTER — LABORATORY RESULT (OUTPATIENT)
Age: 69
End: 2024-11-14

## 2024-11-14 ENCOUNTER — OUTPATIENT (OUTPATIENT)
Dept: OUTPATIENT SERVICES | Facility: HOSPITAL | Age: 69
LOS: 1 days | End: 2024-11-14
Payer: MEDICAID

## 2024-11-14 DIAGNOSIS — Z90.49 ACQUIRED ABSENCE OF OTHER SPECIFIED PARTS OF DIGESTIVE TRACT: Chronic | ICD-10-CM

## 2024-11-14 DIAGNOSIS — R74.8 ABNORMAL LEVELS OF OTHER SERUM ENZYMES: ICD-10-CM

## 2024-11-14 DIAGNOSIS — Z98.890 OTHER SPECIFIED POSTPROCEDURAL STATES: Chronic | ICD-10-CM

## 2024-11-14 DIAGNOSIS — Z90.710 ACQUIRED ABSENCE OF BOTH CERVIX AND UTERUS: Chronic | ICD-10-CM

## 2024-11-14 PROCEDURE — 85613 RUSSELL VIPER VENOM DILUTED: CPT

## 2024-11-14 PROCEDURE — 82570 ASSAY OF URINE CREATININE: CPT

## 2024-11-14 PROCEDURE — 83540 ASSAY OF IRON: CPT

## 2024-11-14 PROCEDURE — 82746 ASSAY OF FOLIC ACID SERUM: CPT

## 2024-11-14 PROCEDURE — 86255 FLUORESCENT ANTIBODY SCREEN: CPT

## 2024-11-14 PROCEDURE — 86803 HEPATITIS C AB TEST: CPT

## 2024-11-14 PROCEDURE — 84156 ASSAY OF PROTEIN URINE: CPT

## 2024-11-14 PROCEDURE — 82043 UR ALBUMIN QUANTITATIVE: CPT

## 2024-11-14 PROCEDURE — 85730 THROMBOPLASTIN TIME PARTIAL: CPT

## 2024-11-14 PROCEDURE — 86704 HEP B CORE ANTIBODY TOTAL: CPT

## 2024-11-14 PROCEDURE — 83550 IRON BINDING TEST: CPT

## 2024-11-14 PROCEDURE — 87340 HEPATITIS B SURFACE AG IA: CPT

## 2024-11-14 PROCEDURE — 86708 HEPATITIS A ANTIBODY: CPT

## 2024-11-14 PROCEDURE — 86147 CARDIOLIPIN ANTIBODY EA IG: CPT

## 2024-11-14 PROCEDURE — 86709 HEPATITIS A IGM ANTIBODY: CPT

## 2024-11-14 PROCEDURE — 82306 VITAMIN D 25 HYDROXY: CPT

## 2024-11-14 PROCEDURE — 82728 ASSAY OF FERRITIN: CPT

## 2024-11-14 PROCEDURE — 82607 VITAMIN B-12: CPT

## 2024-11-14 PROCEDURE — 85027 COMPLETE CBC AUTOMATED: CPT

## 2024-11-14 PROCEDURE — 86146 BETA-2 GLYCOPROTEIN ANTIBODY: CPT

## 2024-11-14 PROCEDURE — 83036 HEMOGLOBIN GLYCOSYLATED A1C: CPT

## 2024-11-14 PROCEDURE — 80053 COMPREHEN METABOLIC PANEL: CPT

## 2024-11-14 PROCEDURE — 86706 HEP B SURFACE ANTIBODY: CPT

## 2024-11-15 DIAGNOSIS — R74.8 ABNORMAL LEVELS OF OTHER SERUM ENZYMES: ICD-10-CM

## 2024-11-20 ENCOUNTER — OUTPATIENT (OUTPATIENT)
Dept: OUTPATIENT SERVICES | Facility: HOSPITAL | Age: 69
LOS: 1 days | End: 2024-11-20
Payer: MEDICARE

## 2024-11-20 ENCOUNTER — MED ADMIN CHARGE (OUTPATIENT)
Age: 69
End: 2024-11-20

## 2024-11-20 ENCOUNTER — APPOINTMENT (OUTPATIENT)
Dept: INTERNAL MEDICINE | Facility: CLINIC | Age: 69
End: 2024-11-20
Payer: MEDICARE

## 2024-11-20 VITALS
WEIGHT: 174 LBS | TEMPERATURE: 98.4 F | BODY MASS INDEX: 30.83 KG/M2 | HEART RATE: 81 BPM | OXYGEN SATURATION: 99 % | DIASTOLIC BLOOD PRESSURE: 74 MMHG | SYSTOLIC BLOOD PRESSURE: 125 MMHG | HEIGHT: 63 IN

## 2024-11-20 DIAGNOSIS — Z00.00 ENCOUNTER FOR GENERAL ADULT MEDICAL EXAMINATION WITHOUT ABNORMAL FINDINGS: ICD-10-CM

## 2024-11-20 DIAGNOSIS — Z90.49 ACQUIRED ABSENCE OF OTHER SPECIFIED PARTS OF DIGESTIVE TRACT: Chronic | ICD-10-CM

## 2024-11-20 DIAGNOSIS — Z98.890 OTHER SPECIFIED POSTPROCEDURAL STATES: Chronic | ICD-10-CM

## 2024-11-20 DIAGNOSIS — Z90.710 ACQUIRED ABSENCE OF BOTH CERVIX AND UTERUS: Chronic | ICD-10-CM

## 2024-11-20 DIAGNOSIS — R80.9 PROTEINURIA, UNSPECIFIED: ICD-10-CM

## 2024-11-20 DIAGNOSIS — E55.9 VITAMIN D DEFICIENCY, UNSPECIFIED: ICD-10-CM

## 2024-11-20 DIAGNOSIS — I10 ESSENTIAL (PRIMARY) HYPERTENSION: ICD-10-CM

## 2024-11-20 DIAGNOSIS — E11.9 TYPE 2 DIABETES MELLITUS WITHOUT COMPLICATIONS: ICD-10-CM

## 2024-11-20 DIAGNOSIS — E11.9 TYPE 2 DIABETES MELLITUS W/OUT COMPLICATIONS: ICD-10-CM

## 2024-11-20 PROCEDURE — G2211 COMPLEX E/M VISIT ADD ON: CPT

## 2024-11-20 PROCEDURE — 99214 OFFICE O/P EST MOD 30 MIN: CPT

## 2024-11-27 ENCOUNTER — RX RENEWAL (OUTPATIENT)
Age: 69
End: 2024-11-27

## 2025-03-28 ENCOUNTER — RX RENEWAL (OUTPATIENT)
Age: 70
End: 2025-03-28

## 2025-05-09 NOTE — ASU PREOP CHECKLIST - TAMPON REMOVED
150 ml of contrast were injected throughout the case. 50 mL of contrast was the total wasted during the case. 200 mL was the total amount used during the case.
A percutaneous stick to the right radial artery was performed. Ultrasound guidance was used to obtain access.
A pre-sedation assessment was completed by the physician immediately prior to sedation start. 
A pulse oximeter was placed on the patient's right index finger.
All catheters were removed. 
All wires were removed. 
An airway assessment has been completed by MD.
An angiography was performed of the left coronary arteries. The angiography was performed via hand injection with 20 mL of contrast.
ID band present and verified. Family is in the lobby.
MD ordered IVF to infuse at 100cc/hr. 
Phone report was given to SARA Timmons
The ECG showed sinus rhythm.
The ECG showed sinus rhythm.
The catheter was inserted into the aorta.
The catheter was inserted into the aorta. Hemodynamics were performed.
The catheter was inserted into the mid   first diagonal artery. Ivus performed
The catheter was inserted into the mid   first diagonal artery. Ivus performed
The catheter was removed from the aorta.
The catheter was repositioned into the left ventricle. Hemodynamics were performed.  and Pullback was recorded.
The catheter was repositioned into the ostium   left main.
The catheter was repositioned into the ostium   left main. An angiography was performed of the left coronary arteries. Multiple views were taken. The angiography was performed via hand injection with 30 mL of contrast.
The catheter was repositioned into the ostium   right coronary artery. An angiography was performed of the right coronary arteries. Multiple views were taken. The angiography was performed via hand injection with 20 mL of contrast.
The defib pads were placed on the patient's chest and back.
The procedural consent was signed. A history and physical note was completed in the chart.
The radial band was applied to the right radial artery.
The sheath was inserted into the right radial artery.
The sheath was removed from the right radial artery.
The site was marked. The right groin and right radial was prepped. The site was prepped with ChloraPrep. The site was clipped. The patient was draped.
The wire was inserted into the aorta.
The wire was inserted into the distal circumflex arteryIFR measured.
The wire was inserted into the distal first diagonal artery.
The wire was removed from the distal circumflex artery.
dry, intact, no bleeding and no hematoma. R radial
n/a

## 2025-06-05 ENCOUNTER — APPOINTMENT (OUTPATIENT)
Dept: RHEUMATOLOGY | Facility: CLINIC | Age: 70
End: 2025-06-05

## 2025-06-25 ENCOUNTER — APPOINTMENT (OUTPATIENT)
Dept: INTERNAL MEDICINE | Facility: CLINIC | Age: 70
End: 2025-06-25
Payer: MEDICARE

## 2025-06-25 ENCOUNTER — NON-APPOINTMENT (OUTPATIENT)
Age: 70
End: 2025-06-25

## 2025-06-25 ENCOUNTER — OUTPATIENT (OUTPATIENT)
Dept: OUTPATIENT SERVICES | Facility: HOSPITAL | Age: 70
LOS: 1 days | End: 2025-06-25
Payer: MEDICARE

## 2025-06-25 VITALS
WEIGHT: 161 LBS | SYSTOLIC BLOOD PRESSURE: 108 MMHG | HEIGHT: 63 IN | HEART RATE: 83 BPM | TEMPERATURE: 97.2 F | OXYGEN SATURATION: 98 % | DIASTOLIC BLOOD PRESSURE: 73 MMHG | BODY MASS INDEX: 28.53 KG/M2

## 2025-06-25 DIAGNOSIS — Z00.00 ENCOUNTER FOR GENERAL ADULT MEDICAL EXAMINATION WITHOUT ABNORMAL FINDINGS: ICD-10-CM

## 2025-06-25 DIAGNOSIS — Z90.49 ACQUIRED ABSENCE OF OTHER SPECIFIED PARTS OF DIGESTIVE TRACT: Chronic | ICD-10-CM

## 2025-06-25 DIAGNOSIS — Z90.710 ACQUIRED ABSENCE OF BOTH CERVIX AND UTERUS: Chronic | ICD-10-CM

## 2025-06-25 DIAGNOSIS — Z98.890 OTHER SPECIFIED POSTPROCEDURAL STATES: Chronic | ICD-10-CM

## 2025-06-25 LAB
ALBUMIN SERPL ELPH-MCNC: 4.9 G/DL
ALP BLD-CCNC: 91 U/L
ALT SERPL-CCNC: 83 U/L
ANION GAP SERPL CALC-SCNC: 13 MMOL/L
AST SERPL-CCNC: 42 U/L
BASOPHILS # BLD AUTO: 0.03 K/UL
BASOPHILS NFR BLD AUTO: 0.5 %
BILIRUB SERPL-MCNC: 0.4 MG/DL
BUN SERPL-MCNC: 20 MG/DL
CALCIUM SERPL-MCNC: 10.6 MG/DL
CHLORIDE SERPL-SCNC: 102 MMOL/L
CHOLEST SERPL-MCNC: 176 MG/DL
CO2 SERPL-SCNC: 21 MMOL/L
CREAT SERPL-MCNC: 1.1 MG/DL
EGFRCR SERPLBLD CKD-EPI 2021: 54 ML/MIN/1.73M2
EOSINOPHIL # BLD AUTO: 0.07 K/UL
EOSINOPHIL NFR BLD AUTO: 1.2 %
ESTIMATED AVERAGE GLUCOSE: 220 MG/DL
GLUCOSE SERPL-MCNC: 189 MG/DL
HBA1C MFR BLD HPLC: 9.3 %
HCT VFR BLD CALC: 45.6 %
HDLC SERPL-MCNC: 47 MG/DL
HGB BLD-MCNC: 14.6 G/DL
IMM GRANULOCYTES NFR BLD AUTO: 0.2 %
LDLC SERPL-MCNC: 107 MG/DL
LYMPHOCYTES # BLD AUTO: 2.56 K/UL
LYMPHOCYTES NFR BLD AUTO: 44.9 %
MAN DIFF?: NORMAL
MCHC RBC-ENTMCNC: 29.1 PG
MCHC RBC-ENTMCNC: 32 G/DL
MCV RBC AUTO: 91 FL
MONOCYTES # BLD AUTO: 0.36 K/UL
MONOCYTES NFR BLD AUTO: 6.3 %
NEUTROPHILS # BLD AUTO: 2.67 K/UL
NEUTROPHILS NFR BLD AUTO: 46.9 %
NONHDLC SERPL-MCNC: 129 MG/DL
PLATELET # BLD AUTO: 320 K/UL
PMV BLD AUTO: 0 /100 WBCS
PMV BLD: 10.7 FL
POTASSIUM SERPL-SCNC: 4.7 MMOL/L
PROT SERPL-MCNC: 8 G/DL
RBC # BLD: 5.01 M/UL
RBC # FLD: 13.9 %
SODIUM SERPL-SCNC: 136 MMOL/L
TRIGL SERPL-MCNC: 121 MG/DL
TSH SERPL-ACNC: 2.01 UIU/ML
WBC # FLD AUTO: 5.7 K/UL

## 2025-06-25 PROCEDURE — 84443 ASSAY THYROID STIM HORMONE: CPT

## 2025-06-25 PROCEDURE — 99214 OFFICE O/P EST MOD 30 MIN: CPT

## 2025-06-25 PROCEDURE — 85025 COMPLETE CBC W/AUTO DIFF WBC: CPT

## 2025-06-25 PROCEDURE — 80053 COMPREHEN METABOLIC PANEL: CPT

## 2025-06-25 PROCEDURE — 83036 HEMOGLOBIN GLYCOSYLATED A1C: CPT

## 2025-06-25 PROCEDURE — G2211 COMPLEX E/M VISIT ADD ON: CPT

## 2025-06-25 PROCEDURE — 80061 LIPID PANEL: CPT

## 2025-06-26 DIAGNOSIS — M17.9 OSTEOARTHRITIS OF KNEE, UNSPECIFIED: ICD-10-CM

## 2025-06-26 DIAGNOSIS — R80.9 PROTEINURIA, UNSPECIFIED: ICD-10-CM

## 2025-06-26 DIAGNOSIS — I10 ESSENTIAL (PRIMARY) HYPERTENSION: ICD-10-CM

## 2025-06-26 DIAGNOSIS — G47.33 OBSTRUCTIVE SLEEP APNEA (ADULT) (PEDIATRIC): ICD-10-CM

## 2025-06-26 DIAGNOSIS — R91.1 SOLITARY PULMONARY NODULE: ICD-10-CM

## 2025-06-26 DIAGNOSIS — E55.9 VITAMIN D DEFICIENCY, UNSPECIFIED: ICD-10-CM

## 2025-06-26 DIAGNOSIS — Z87.891 PERSONAL HISTORY OF NICOTINE DEPENDENCE: ICD-10-CM

## 2025-06-26 DIAGNOSIS — Z00.00 ENCOUNTER FOR GENERAL ADULT MEDICAL EXAMINATION WITHOUT ABNORMAL FINDINGS: ICD-10-CM

## 2025-06-26 DIAGNOSIS — E11.9 TYPE 2 DIABETES MELLITUS WITHOUT COMPLICATIONS: ICD-10-CM

## 2025-06-26 DIAGNOSIS — E78.5 HYPERLIPIDEMIA, UNSPECIFIED: ICD-10-CM

## 2025-06-26 DIAGNOSIS — M32.9 SYSTEMIC LUPUS ERYTHEMATOSUS, UNSPECIFIED: ICD-10-CM

## 2025-07-28 ENCOUNTER — RESULT REVIEW (OUTPATIENT)
Age: 70
End: 2025-07-28

## 2025-07-28 ENCOUNTER — OUTPATIENT (OUTPATIENT)
Dept: OUTPATIENT SERVICES | Facility: HOSPITAL | Age: 70
LOS: 1 days | End: 2025-07-28
Payer: MEDICARE

## 2025-07-28 DIAGNOSIS — K76.0 FATTY (CHANGE OF) LIVER, NOT ELSEWHERE CLASSIFIED: ICD-10-CM

## 2025-07-28 DIAGNOSIS — Z98.890 OTHER SPECIFIED POSTPROCEDURAL STATES: Chronic | ICD-10-CM

## 2025-07-28 DIAGNOSIS — Z90.49 ACQUIRED ABSENCE OF OTHER SPECIFIED PARTS OF DIGESTIVE TRACT: Chronic | ICD-10-CM

## 2025-07-28 DIAGNOSIS — R91.8 OTHER NONSPECIFIC ABNORMAL FINDING OF LUNG FIELD: ICD-10-CM

## 2025-07-28 DIAGNOSIS — Z90.710 ACQUIRED ABSENCE OF BOTH CERVIX AND UTERUS: Chronic | ICD-10-CM

## 2025-07-28 PROCEDURE — 76705 ECHO EXAM OF ABDOMEN: CPT | Mod: 26

## 2025-07-28 PROCEDURE — 71271 CT THORAX LUNG CANCER SCR C-: CPT

## 2025-07-28 PROCEDURE — 71271 CT THORAX LUNG CANCER SCR C-: CPT | Mod: 26

## 2025-07-28 PROCEDURE — 76705 ECHO EXAM OF ABDOMEN: CPT

## 2025-07-29 DIAGNOSIS — R91.8 OTHER NONSPECIFIC ABNORMAL FINDING OF LUNG FIELD: ICD-10-CM

## 2025-07-29 DIAGNOSIS — K76.0 FATTY (CHANGE OF) LIVER, NOT ELSEWHERE CLASSIFIED: ICD-10-CM

## 2025-08-04 ENCOUNTER — RX RENEWAL (OUTPATIENT)
Age: 70
End: 2025-08-04